# Patient Record
Sex: FEMALE | Race: WHITE | Employment: UNEMPLOYED | URBAN - METROPOLITAN AREA
[De-identification: names, ages, dates, MRNs, and addresses within clinical notes are randomized per-mention and may not be internally consistent; named-entity substitution may affect disease eponyms.]

---

## 2009-01-16 LAB — PAP SMEAR - HIM PATIENT REPORTED: NORMAL

## 2017-01-19 ENCOUNTER — ONCOLOGY VISIT (OUTPATIENT)
Dept: TRANSPLANT | Facility: CLINIC | Age: 57
End: 2017-01-19
Attending: INTERNAL MEDICINE
Payer: MEDICARE

## 2017-01-19 ENCOUNTER — APPOINTMENT (OUTPATIENT)
Dept: LAB | Facility: CLINIC | Age: 57
End: 2017-01-19
Attending: INTERNAL MEDICINE
Payer: MEDICARE

## 2017-01-19 VITALS
HEART RATE: 81 BPM | WEIGHT: 185.9 LBS | RESPIRATION RATE: 16 BRPM | DIASTOLIC BLOOD PRESSURE: 64 MMHG | TEMPERATURE: 97.2 F | SYSTOLIC BLOOD PRESSURE: 133 MMHG

## 2017-01-19 DIAGNOSIS — C91.01 ACUTE LYMPHOCYTIC LEUKEMIA IN REMISSION (H): ICD-10-CM

## 2017-01-19 LAB
ALBUMIN SERPL-MCNC: 3.7 G/DL (ref 3.4–5)
ALP SERPL-CCNC: 108 U/L (ref 40–150)
ALT SERPL W P-5'-P-CCNC: 23 U/L (ref 0–50)
ANION GAP SERPL CALCULATED.3IONS-SCNC: 7 MMOL/L (ref 3–14)
AST SERPL W P-5'-P-CCNC: 25 U/L (ref 0–45)
BASOPHILS # BLD AUTO: 0.1 10E9/L (ref 0–0.2)
BASOPHILS NFR BLD AUTO: 0.8 %
BILIRUB SERPL-MCNC: 0.2 MG/DL (ref 0.2–1.3)
BUN SERPL-MCNC: 15 MG/DL (ref 7–30)
CALCIUM SERPL-MCNC: 8.5 MG/DL (ref 8.5–10.1)
CHLORIDE SERPL-SCNC: 110 MMOL/L (ref 94–109)
CO2 SERPL-SCNC: 26 MMOL/L (ref 20–32)
CREAT SERPL-MCNC: 1.06 MG/DL (ref 0.52–1.04)
DIFFERENTIAL METHOD BLD: NORMAL
EOSINOPHIL # BLD AUTO: 0.3 10E9/L (ref 0–0.7)
EOSINOPHIL NFR BLD AUTO: 3.1 %
ERYTHROCYTE [DISTWIDTH] IN BLOOD BY AUTOMATED COUNT: 13.5 % (ref 10–15)
GFR SERPL CREATININE-BSD FRML MDRD: 53 ML/MIN/1.7M2
GLUCOSE SERPL-MCNC: 92 MG/DL (ref 70–99)
HCT VFR BLD AUTO: 38.7 % (ref 35–47)
HGB BLD-MCNC: 12.8 G/DL (ref 11.7–15.7)
IMM GRANULOCYTES # BLD: 0 10E9/L (ref 0–0.4)
IMM GRANULOCYTES NFR BLD: 0.4 %
LYMPHOCYTES # BLD AUTO: 2.5 10E9/L (ref 0.8–5.3)
LYMPHOCYTES NFR BLD AUTO: 27.2 %
MCH RBC QN AUTO: 32.3 PG (ref 26.5–33)
MCHC RBC AUTO-ENTMCNC: 33.1 G/DL (ref 31.5–36.5)
MCV RBC AUTO: 98 FL (ref 78–100)
MONOCYTES # BLD AUTO: 1 10E9/L (ref 0–1.3)
MONOCYTES NFR BLD AUTO: 10.9 %
NEUTROPHILS # BLD AUTO: 5.2 10E9/L (ref 1.6–8.3)
NEUTROPHILS NFR BLD AUTO: 57.6 %
NRBC # BLD AUTO: 0 10*3/UL
NRBC BLD AUTO-RTO: 0 /100
PLATELET # BLD AUTO: 305 10E9/L (ref 150–450)
POTASSIUM SERPL-SCNC: 3.9 MMOL/L (ref 3.4–5.3)
PROT SERPL-MCNC: 6.8 G/DL (ref 6.8–8.8)
RBC # BLD AUTO: 3.96 10E12/L (ref 3.8–5.2)
SODIUM SERPL-SCNC: 144 MMOL/L (ref 133–144)
WBC # BLD AUTO: 9.1 10E9/L (ref 4–11)

## 2017-01-19 PROCEDURE — 85025 COMPLETE CBC W/AUTO DIFF WBC: CPT | Performed by: INTERNAL MEDICINE

## 2017-01-19 PROCEDURE — 80053 COMPREHEN METABOLIC PANEL: CPT | Performed by: INTERNAL MEDICINE

## 2017-01-19 PROCEDURE — 99212 OFFICE O/P EST SF 10 MIN: CPT | Mod: ZF

## 2017-01-19 PROCEDURE — 81206 BCR/ABL1 GENE MAJOR BP: CPT | Performed by: INTERNAL MEDICINE

## 2017-01-19 PROCEDURE — 36415 COLL VENOUS BLD VENIPUNCTURE: CPT

## 2017-01-19 PROCEDURE — 81268 CHIMERISM ANAL W/CELL SELECT: CPT | Performed by: INTERNAL MEDICINE

## 2017-01-19 NOTE — MR AVS SNAPSHOT
After Visit Summary   1/19/2017    Kat Conteh    MRN: [id number]           Patient Information     Date Of Birth          1960        Visit Information        Provider Department      1/19/2017 10:30 AM Moon Quiñonez MD [facility] Blood and Marrow Transplant        Today's Diagnoses     Acute lymphocytic leukemia in remission (H)               [facility] [place] ([facility])  [address] [place] [address]  Phone: [phone]  Clinic Hours:   Monday-Friday:    8am to 4:30pm   Weekends and holidays:    8am to noon (in general)  If your fever is 100.5  or greater,   call the clinic.  After hours call the   hospital at [phone] or   [phone]. Ask for the BMT   fellow for pediatric or adult patients           Care Instructions    7/20 1030am labs and         Follow-ups after your visit        Your next 10 appointments already scheduled     Jul 20, 2017 11:00 AM   Return with Moon Quiñonez MD   [facility] Blood and Marrow Transplant ([facility] [place])    [address] [place] [address] [phone]              Who to contact     If you have questions or need follow up information about today's clinic visit or your schedule please contact [facility] BLOOD AND MARROW TRANSPLANT directly at [phone].  Normal or non-critical lab and imaging results will be communicated to you by MyChart, letter or phone within 4 business days after the clinic has received the results. If you do not hear from us within 7 days, please contact the clinic through MyChart or phone. If you have a critical or abnormal lab result, we will notify you by phone as soon as possible.  Submit refill requests through [employer] or call your pharmacy and they will forward the refill request to us. Please allow 3 business days for your refill to be completed.          Additional Information About Your Visit        MyChart Information      "[employer] lets you send messages to your doctor, view your test results, renew your prescriptions, schedule appointments and more. To sign up, go to [email or web address] . Click on \"Log in\" on the left side of the screen, which will take you to the Welcome page. Then click on \"Sign up Now\" on the right side of the page.     You will be asked to enter the access code listed below, as well as some personal information. Please follow the directions to create your username and password.     Your access code is: [email or web address]  Expires: 2017  6:30 AM     Your access code will  in 90 days. If you need help or a new code, please call your [place] clinic or [phone].        Care EveryWhere ID     This is your Care EveryWhere ID. This could be used by other organizations to access your [place] medical records  [id number]        Your Vitals Were     Pulse Temperature Respirations             81 97.2  F (36.2  C) (Oral) 16          Blood Pressure from Last 3 Encounters:   17 133/64   16 122/69   07/23/15 111/58    Weight from Last 3 Encounters:   17 84.324 kg (185 lb 14.4 oz)   16 81.7 kg (180 lb 1.9 oz)   07/23/15 81.3 kg (179 lb 3.7 oz)              We Performed the Following     BCR ABL1 Major Breakpoint Quant p210     CBC with platelets differential     Comprehensive metabolic panel     DNA marker post bmt engraft bld          Today's Medication Changes          These changes are accurate as of: 17 10:54 AM.  If you have any questions, ask your nurse or doctor.               These medicines have changed or have updated prescriptions.        Dose/Directions    omeprazole 20 MG CR capsule   Commonly known as:  priLOSEC   This may have changed:    - how much to take  - additional instructions   Used for:  ALL (acute lymphocytic leukemia) (H)        Dose:  40 mg   Take 2 capsules (40 mg) by mouth daily 30 minutes before breakfast.   Quantity:  60 capsule   Refills:  10              "   Recent Review Flowsheet Data     BMT Recent Results Latest Ref Rng 1/31/2012 7/17/2012 7/16/2013 7/7/2014 7/23/2015 7/21/2016 1/19/2017    WBC 4.0 - 11.0 10e9/L 6.5 6.2 7.0 7.1 6.5 7.5 9.1    Hemoglobin 11.7 - 15.7 g/dL 12.6 12.6 12.3 11.8 12.2 12.0 12.8    Platelet Count 150 - 450 10e9/L 239 232 257 233 238 271 305    Neutrophils (Absolute) 1.6 - 8.3 10e9/L 3.6 3.5 3.8 4.1 3.6 4.9 5.2    Sodium 133 - 144 mmol/L 142 143 141 140 143 141 144    Potassium 3.4 - 5.3 mmol/L 3.6 3.5 4.0 4.1 4.3 3.7 3.9    Chloride 94 - 109 mmol/L 104 105 104 102 110(H) 107 110(H)    Glucose 70 - 99 mg/dL 115(H) 109(H) 95 93 94 79 92    Urea Nitrogen 7 - 30 mg/dL 19 14 15 16 17 16 15    Creatinine 0.52 - 1.04 mg/dL 1.16(H) 1.15(H) 1.08(H) 1.14(H) 1.15(H) 1.14(H) 1.06(H)    Calcium (Total) 8.5 - 10.1 mg/dL 8.8 8.7 9.1 8.9 8.7 8.6 8.5    Protein (Total) 6.8 - 8.8 g/dL 6.5(L) 6.4(L) 6.7(L) 6.7(L) 6.1(L) 6.7(L) 6.8    Albumin 3.4 - 5.0 g/dL 3.7(L) 3.8(L) 4.0 4.0 3.5 3.6 3.7    Alkaline Phosphatase 40 - 150 U/L 101 84 110 92 108 104 108    AST 0 - 45 U/L 26 27 31 30 21 22 25    ALT 0 - 50 U/L 17 19 32 33 25 22 23    MCV 78 - 100 fl 101(H) 100 101(H) 100 101(H) 103(H) 98               Primary Care Provider Office Phone # Fax #    Duane Eric Westberg [phone]       [address] [address]        Thank you!     Thank you for choosing [facility] BLOOD AND MARROW TRANSPLANT  for your care. Our goal is always to provide you with excellent care. Hearing back from our patients is one way we can continue to improve our services. Please take a few minutes to complete the written survey that you may receive in the mail after your visit with us. Thank you!             Your Updated Medication List - Protect others around you: Learn how to safely use, store and throw away your medicines at www.disposemymeds.org.          This list is accurate as of: 1/19/17 10:54 AM.  Always use your most recent med list.                    Brand Name Dispense Instructions for use    fentaNYL 25 mcg/hr 72 hr patch    DURAGESIC    10 patch    Place 1 patch onto the skin every 72 hours.       furosemide 20 MG tablet    LASIX    30 tablet    Take 1 tablet (20 mg) by mouth daily AS NEEDED       imatinib 100 MG tablet    GLEEVEC    90 tablet    Take 3 tablets (300 mg) by mouth daily       omeprazole 20 MG CR capsule    priLOSEC    60 capsule    Take 2 capsules (40 mg) by mouth daily 30 minutes before breakfast.       oxyCODONE 5 MG IR tablet    ROXICODONE    120 tablet    Take 1-2 tablets by mouth every 4 hours as needed. Take 1-2 tablets q4-6 hours prn pain. Disp. 120       vitamin D3 78576 UNITS capsule    CHOLECALCIFEROL     Take 5,000 Units by mouth daily

## 2017-01-19 NOTE — PROGRESS NOTES
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 She obtained her repeat CR with imatinib alone and no DLI. We did collect lymphocytes for potential DLI should the need arise. She has remained on imatinib since that time in ongoing molecular remission and 100% donor.     HPI: I last saw her 6 months ago at which point she wanted to attempt a drop in imatinib dose due to side effects and her long remission status. She notes that the drop in dosage has helped with the muscle cramps. The episodes are no longer daily/nightly and seem less intense. She notes no major infections since the summer. Notes that she had a recent physical with her primary doctor and that all checked out good. She got her flu shot as well. No new procedures, no hospitalizations, no major concerns. Bowels ok. Has had episodes of small patches of skin blisters that come up in various parts of her body and last about 5-10 days. She notes that they look like \"poison ivy\" and resolve with either calamine lotion or the hydrocortisone. They are itchy but no tingling or numbness or pain to suggest shingles.     ROS: 10 point ROS otherwise negative     PHYSICAL EXAM:     /64 mmHg  Pulse 81  Temp(Src) 97.2  F (36.2  C) (Oral)  Resp 16  Wt 84.324 kg (185 lb 14.4 oz)    Gen: Looks well, NAD. ECOG PS 1  HEENT: no icterus or injection, OP clear, buccal mucosa normal. No thrush  Lungs:CTAB no crackles or wheezes  CV:RRR no r/m/g  Abd: soft, NT/ND  Ext: no edema, no rash.     Labs:  WBC      9.1   1/19/2017  RBC     3.96   1/19/2017  HGB     12.8   1/19/2017  HCT     38.7   1/19/2017  No components found with this name: mct  MCV       98   1/19/2017  MCH     32.3   1/19/2017  MCHC     33.1   " 1/19/2017  RDW     13.5   1/19/2017  PLT      305   1/19/2017    Last Basic Metabolic Panel:  NA      144   1/19/2017   POTASSIUM      3.9   1/19/2017  CHLORIDE      110   1/19/2017  KYLAH      8.5   1/19/2017  CO2       26   1/19/2017  BUN       15   1/19/2017  CR     1.06   1/19/2017  GLC       92   1/19/2017  AST       25   1/19/2017  ALT       23   1/19/2017  BILICONJ      0.0   12/7/2010   BILITOTAL      0.2   1/19/2017  ALBUMIN      3.7   1/19/2017  PROTTOTAL      6.8   1/19/2017   ALKPHOS      108   1/19/2017        ASSESSMENT/PLANS:     1. 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  --2 year anniversary visit (7/2011) showed no marrow evidence of ALL and 100% donor. BCR-ABL PCR on blood undetectable. FISH testing on marrow undetectable.  -- 3 -6 year anniversary visit with no evidence of disease  -- Repeat PB BCR-ABL pending from today. Discussed that the ongoing need for gleevec now this many years out is unclear as there is no data to direct this. Given 100% donor and bcr-abl negative for multiple years discussed trying to taper the gleevec off, also in light of her symptoms. She is doing well with the drop to 300 mg daily. Repeat BCR-ABL pending. If remains undetectable she is wanting to drop to 200 mg daily which is reasonable given how far out she is from transplant.      2. 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 Lip biopsy completed 4/13 and showed mild sialadenitis with focal atrophy suggestive of cGVHD. Not needing any treatment and not active currently     3. ID: no current antibiotics. No recent infections over last  6 months.   --S/P 1 year anniversary vaccines 7/10, 14 month boosters 10/10 (including flu shot).  --2 year anniversary vaccines 7/2011     4. GI: no issues  --cont omeprazole but has dropped the dose given the studies showing long term kidney impacts.        5. FEN/Renal: stable to improved    6. Health Maintenance: has a primary doctor, Dr. Castellano, back home. Recent evaluation in December 7. Pain control: Being managed by her primary doc    8. Muscle cramps: intermittent.Improving with lower imatinib dose    Final Plan:  - RTC in 6 months  - if BCR-ABL still undetectable will drop dosing to 200 mg daily      Moon Quiñonez MD    Addendum 1/19/2017:  BCR-ABL undetectable and 100% donor. Drop gleevec to 200 mg daily.    Moon Quiñonez

## 2017-01-19 NOTE — NURSING NOTE
BMT Heme Malignancy Rooming Note    Kat Conteh - 1/19/2017 10:25 AM     Chief Complaint   Patient presents with     Blood Draw     Pt is here for labs for her BMT provider visit     RECHECK     Patient here to see provider and labs follow up for ALL        /64 mmHg  Pulse 81  Temp(Src) 97.2  F (36.2  C) (Oral)  Resp 16  Wt 84.324 kg (185 lb 14.4 oz)     Medications reviewed: Yes    Labs drawn: No    Dressing changed: Not applicable     Medications given: No    Staff time:5    Additional information if applicable: Patient denies any complaints    Lolis Sun RN, OCN, BMTCN

## 2017-01-20 LAB
COPATH REPORT: NORMAL

## 2017-01-23 RX ORDER — IMATINIB MESYLATE 100 MG/1
200 TABLET, FILM COATED ORAL DAILY
Qty: 60 TABLET | Refills: 3 | Status: SHIPPED
Start: 2017-01-23 | End: 2017-07-20

## 2017-07-20 ENCOUNTER — APPOINTMENT (OUTPATIENT)
Dept: LAB | Facility: CLINIC | Age: 57
End: 2017-07-20
Attending: INTERNAL MEDICINE
Payer: MEDICARE

## 2017-07-20 ENCOUNTER — ONCOLOGY VISIT (OUTPATIENT)
Dept: TRANSPLANT | Facility: CLINIC | Age: 57
End: 2017-07-20
Attending: INTERNAL MEDICINE
Payer: MEDICARE

## 2017-07-20 VITALS
OXYGEN SATURATION: 98 % | SYSTOLIC BLOOD PRESSURE: 123 MMHG | WEIGHT: 187.1 LBS | TEMPERATURE: 97.7 F | RESPIRATION RATE: 16 BRPM | HEART RATE: 72 BPM | DIASTOLIC BLOOD PRESSURE: 74 MMHG

## 2017-07-20 DIAGNOSIS — C91.01 ACUTE LYMPHOCYTIC LEUKEMIA IN REMISSION (H): ICD-10-CM

## 2017-07-20 LAB
ALBUMIN SERPL-MCNC: 3.6 G/DL (ref 3.4–5)
ALP SERPL-CCNC: 110 U/L (ref 40–150)
ALT SERPL W P-5'-P-CCNC: 21 U/L (ref 0–50)
ANION GAP SERPL CALCULATED.3IONS-SCNC: 6 MMOL/L (ref 3–14)
AST SERPL W P-5'-P-CCNC: 16 U/L (ref 0–45)
BASOPHILS # BLD AUTO: 0.1 10E9/L (ref 0–0.2)
BASOPHILS NFR BLD AUTO: 1 %
BILIRUB SERPL-MCNC: 0.4 MG/DL (ref 0.2–1.3)
BUN SERPL-MCNC: 23 MG/DL (ref 7–30)
CALCIUM SERPL-MCNC: 8.7 MG/DL (ref 8.5–10.1)
CHLORIDE SERPL-SCNC: 103 MMOL/L (ref 94–109)
CO2 SERPL-SCNC: 31 MMOL/L (ref 20–32)
CREAT SERPL-MCNC: 1.14 MG/DL (ref 0.52–1.04)
DIFFERENTIAL METHOD BLD: ABNORMAL
EOSINOPHIL # BLD AUTO: 0.3 10E9/L (ref 0–0.7)
EOSINOPHIL NFR BLD AUTO: 3.7 %
ERYTHROCYTE [DISTWIDTH] IN BLOOD BY AUTOMATED COUNT: 14.1 % (ref 10–15)
GFR SERPL CREATININE-BSD FRML MDRD: 49 ML/MIN/1.7M2
GLUCOSE SERPL-MCNC: 99 MG/DL (ref 70–99)
HCT VFR BLD AUTO: 42 % (ref 35–47)
HGB BLD-MCNC: 13.1 G/DL (ref 11.7–15.7)
IMM GRANULOCYTES # BLD: 0 10E9/L (ref 0–0.4)
IMM GRANULOCYTES NFR BLD: 0.3 %
LYMPHOCYTES # BLD AUTO: 2.3 10E9/L (ref 0.8–5.3)
LYMPHOCYTES NFR BLD AUTO: 32.3 %
MCH RBC QN AUTO: 30.8 PG (ref 26.5–33)
MCHC RBC AUTO-ENTMCNC: 31.2 G/DL (ref 31.5–36.5)
MCV RBC AUTO: 99 FL (ref 78–100)
MONOCYTES # BLD AUTO: 0.9 10E9/L (ref 0–1.3)
MONOCYTES NFR BLD AUTO: 12.7 %
NEUTROPHILS # BLD AUTO: 3.5 10E9/L (ref 1.6–8.3)
NEUTROPHILS NFR BLD AUTO: 50 %
NRBC # BLD AUTO: 0 10*3/UL
NRBC BLD AUTO-RTO: 0 /100
PLATELET # BLD AUTO: 311 10E9/L (ref 150–450)
POTASSIUM SERPL-SCNC: 3.6 MMOL/L (ref 3.4–5.3)
PROT SERPL-MCNC: 6.9 G/DL (ref 6.8–8.8)
RBC # BLD AUTO: 4.26 10E12/L (ref 3.8–5.2)
SODIUM SERPL-SCNC: 141 MMOL/L (ref 133–144)
WBC # BLD AUTO: 7.1 10E9/L (ref 4–11)

## 2017-07-20 PROCEDURE — 85025 COMPLETE CBC W/AUTO DIFF WBC: CPT | Performed by: INTERNAL MEDICINE

## 2017-07-20 PROCEDURE — 81206 BCR/ABL1 GENE MAJOR BP: CPT | Performed by: INTERNAL MEDICINE

## 2017-07-20 PROCEDURE — 36415 COLL VENOUS BLD VENIPUNCTURE: CPT

## 2017-07-20 PROCEDURE — 99212 OFFICE O/P EST SF 10 MIN: CPT | Mod: ZF

## 2017-07-20 PROCEDURE — 80053 COMPREHEN METABOLIC PANEL: CPT | Performed by: INTERNAL MEDICINE

## 2017-07-20 RX ORDER — IMATINIB MESYLATE 100 MG/1
200 TABLET, FILM COATED ORAL DAILY
Qty: 60 TABLET | Refills: 3 | Status: SHIPPED | OUTPATIENT
Start: 2017-07-20 | End: 2017-07-28

## 2017-07-20 ASSESSMENT — PAIN SCALES - GENERAL: PAINLEVEL: NO PAIN (0)

## 2017-07-20 NOTE — NURSING NOTE
Oncology Rooming Note    July 20, 2017 10:42 AM   Kat Conteh is a 57 year old female who presents for:    Chief Complaint   Patient presents with     Blood Draw     Labs drawn from right arm by MA     RECHECK     ALL     Initial Vitals: /74  Pulse 72  Temp 97.7  F (36.5  C) (Oral)  Resp 16  Wt 84.9 kg (187 lb 1.6 oz)  SpO2 98% There is no height or weight on file to calculate BMI. There is no height or weight on file to calculate BSA.  No Pain (0) Comment: Data Unavailable   No LMP recorded. Patient is postmenopausal.  Allergies reviewed: Yes  Medications reviewed: Yes    Medications: Medication refills not needed today.  Pharmacy name entered into [employer]: [id number]    Clinical concerns: n/a     5 minutes for nursing intake (face to face time)     BRYCE SWARTZ CMA

## 2017-07-20 NOTE — NURSING NOTE
Chief Complaint   Patient presents with     Blood Draw     Labs drawn from right arm by MA     Pt tolerated well  Marie Fernandez MA

## 2017-07-20 NOTE — MR AVS SNAPSHOT
After Visit Summary   7/20/2017    Kat Conteh    MRN: [id number]           Patient Information     Date Of Birth          1960        Visit Information        Provider Department      7/20/2017 11:00 AM Moon Quiñonez MD [facility] Blood and Marrow Transplant        Today's Diagnoses     Acute lymphocytic leukemia in remission ()              [facility] [place] ([facility])  [address] [place] [address]  Phone: [phone]  Clinic Hours:   Monday-Thursday:7am to 7pm   Friday: 7am to 5pm   Weekends and holidays:    8am to noon (in general)  If your fever is 100.5  or greater,   call the clinic.  After hours call the   hospital at [phone] or   [phone]. Ask for the BMT   fellow on-call           Care Instructions    traci bmt in 6 months with labs prior          Follow-ups after your visit        Your next 10 appointments already scheduled     Jan 18, 2018 10:30 AM CST   [facility] with  [facility]   [facility] ([facility])    [address] [place] [address] [phone]            Jan 18, 2018 11:00 AM CST   Return with Moon Quiñonez MD   [facility] Blood and Marrow Transplant ([facility])    [address] [place] [address] [phone]              Who to contact     If you have questions or need follow up information about today's clinic visit or your schedule please contact [facility] BLOOD AND MARROW TRANSPLANT directly at [phone].  Normal or non-critical lab and imaging results will be communicated to you by MyChart, letter or phone within 4 business days after the clinic has received the results. If you do not hear from us within 7 days, please contact the clinic through MyChart or phone. If you have a critical or abnormal lab result, we will notify you by phone as soon as possible.  Submit refill requests  "through [employer] or call your pharmacy and they will forward the refill request to us. Please allow 3 business days for your refill to be completed.          Additional Information About Your Visit        [employer] Information     [employer] lets you send messages to your doctor, view your test results, renew your prescriptions, schedule appointments and more. To sign up, go to [email or web address] . Click on \"Log in\" on the left side of the screen, which will take you to the Welcome page. Then click on \"Sign up Now\" on the right side of the page.     You will be asked to enter the access code listed below, as well as some personal information. Please follow the directions to create your username and password.     Your access code is: [email or web address]  Expires: 10/4/2017  6:30 AM     Your access code will  in 90 days. If you need help or a new code, please call your [place] clinic or [phone].        Care EveryWhere ID     This is your Care EveryWhere ID. This could be used by other organizations to access your [place] medical records  [id number]        Your Vitals Were     Pulse Temperature Respirations Pulse Oximetry          72 97.7  F (36.5  C) (Oral) 16 98%         Blood Pressure from Last 3 Encounters:   17 123/74   17 133/64   16 122/69    Weight from Last 3 Encounters:   17 84.9 kg (187 lb 1.6 oz)   17 84.3 kg (185 lb 14.4 oz)   16 81.7 kg (180 lb 1.9 oz)              We Performed the Following     BCR ABL1 Major Breakpoint Quant p210     CBC with platelets differential     Comprehensive metabolic panel          Where to get your medicines      These medications were sent to [id number] - [place], [place] - [address] [place] [address] [address] [place] [address] [address] [place] [address]    Hours:  Test Rx sent successfully 10/3/03   Phone:  [phone]     imatinib 100 MG tablet          Recent Review Flowsheet Data     BMT Recent Results Latest Ref Rng & Units " 7/17/2012 7/16/2013 7/7/2014 7/23/2015 7/21/2016 1/19/2017 7/20/2017    WBC 4.0 - 11.0 10e9/L 6.2 7.0 7.1 6.5 7.5 9.1 7.1    Hemoglobin 11.7 - 15.7 g/dL 12.6 12.3 11.8 12.2 12.0 12.8 13.1    Platelet Count 150 - 450 10e9/L 232 257 233 238 271 305 311    Neutrophils (Absolute) 1.6 - 8.3 10e9/L 3.5 3.8 4.1 3.6 4.9 5.2 3.5    INR 0.86 - 1.14 - - - - - - -    Sodium 133 - 144 mmol/L 143 141 140 143 141 144 -    Potassium 3.4 - 5.3 mmol/L 3.5 4.0 4.1 4.3 3.7 3.9 -    Chloride 94 - 109 mmol/L 105 104 102 110(H) 107 110(H) -    Glucose 70 - 99 mg/dL 109(H) 95 93 94 79 92 -    Urea Nitrogen 7 - 30 mg/dL 14 15 16 17 16 15 -    Creatinine 0.52 - 1.04 mg/dL 1.15(H) 1.08(H) 1.14(H) 1.15(H) 1.14(H) 1.06(H) -    Calcium (Total) 8.5 - 10.1 mg/dL 8.7 9.1 8.9 8.7 8.6 8.5 -    Protein (Total) 6.8 - 8.8 g/dL 6.4(L) 6.7(L) 6.7(L) 6.1(L) 6.7(L) 6.8 -    Albumin 3.4 - 5.0 g/dL 3.8(L) 4.0 4.0 3.5 3.6 3.7 -    Alkaline Phosphatase 40 - 150 U/L 84 110 92 108 104 108 -    AST 0 - 45 U/L 27 31 30 21 22 25 -    ALT 0 - 50 U/L 19 32 33 25 22 23 -    MCV 78 - 100 fl 100 101(H) 100 101(H) 103(H) 98 99               Primary Care Provider Office Phone # Fax #    Duane Eric Westberg [phone]       [address] [address]        Equal Access to Services     IAM GILLIAM : Javi rashid Soisaias, wanicolasada luqadaha, qaybta kaalmada adedavid, gallito franco. So [facility] [phone].    ATENCIÓN: Si habla español, tiene a borden disposición servicios gratuitos de asistencia lingüística. Llame al [phone].    We comply with applicable federal civil rights laws and [place] laws. We do not discriminate on the basis of race, color, national origin, age, disability sex, sexual orientation or gender identity.            Thank you!     Thank you for choosing [facility] BLOOD AND MARROW TRANSPLANT  for your care. Our goal is always to provide you with excellent care. Hearing back  from our patients is one way we can continue to improve our services. Please take a few minutes to complete the written survey that you may receive in the mail after your visit with us. Thank you!             Your Updated Medication List - Protect others around you: Learn how to safely use, store and throw away your medicines at www.disposemymeds.org.          This list is accurate as of: 7/20/17 11:07 AM.  Always use your most recent med list.                   Brand Name Dispense Instructions for use Diagnosis    fentaNYL 25 mcg/hr 72 hr patch    DURAGESIC    10 patch    Place 1 patch onto the skin every 72 hours.    ALL (acute lymphocytic leukemia) (H), Radiating back pain       furosemide 20 MG tablet    LASIX    30 tablet    Take 1 tablet (20 mg) by mouth daily AS NEEDED    ALL (acute lymphocytic leukemia) (H), Status post bone marrow transplant (H)       imatinib 100 MG tablet    GLEEVEC    60 tablet    Take 2 tablets (200 mg) by mouth daily    Acute lymphocytic leukemia in remission (H)       oxyCODONE 5 MG IR tablet    ROXICODONE    120 tablet    Take 1-2 tablets by mouth every 4 hours as needed. Take 1-2 tablets q4-6 hours prn pain. Disp. 120    ALL (acute lymphocytic leukemia) (H), Radiating back pain       vitamin D3 34189 UNITS capsule    CHOLECALCIFEROL     Take 5,000 Units by mouth daily        ZANTAC PO      Take 150 mg by mouth 2 times daily

## 2017-07-20 NOTE — PROGRESS NOTES
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 She obtained her repeat CR with imatinib alone and no DLI. We did collect lymphocytes for potential DLI should the need arise. She has remained on imatinib since that time in ongoing molecular remission and 100% donor.     HPI: I last saw her 6 months ago at which point we further dropped her imatinib a little further to 200 mg daily. She comes in today for labs and follow-up. Since I saw her in January she is doing great. Symptoms much improved on the 200 mg dosing and is anxious to hopefully come off. The only issue now is a flare in her GERD with her primary docs switch of prilosec to zantac. No diarrhea, no oral issues, no rash, muscle cramps much improved.    ROS: 10 point ROS otherwise negative     PHYSICAL EXAM:     /74  Pulse 72  Temp 97.7  F (36.5  C) (Oral)  Resp 16  Wt 84.9 kg (187 lb 1.6 oz)  SpO2 98%    Gen: Looks well, NAD. ECOG PS 1  HEENT: no icterus or injection, OP clear, buccal mucosa normal. No thrush  Lungs:CTAB no crackles or wheezes  CV:RRR no r/m/g  Abd: soft, NT/ND  Ext: no edema, no rash.     Labs:  Results for KEYSHA PAGE (MRN [id number]) as of 7/20/2017 12:18   Ref. Range 7/20/2017 10:35   Sodium Latest Ref Range: 133 - 144 mmol/L 141   Potassium Latest Ref Range: 3.4 - 5.3 mmol/L 3.6   Chloride Latest Ref Range: 94 - 109 mmol/L 103   Carbon Dioxide Latest Ref Range: 20 - 32 mmol/L 31   Urea Nitrogen Latest Ref Range: 7 - 30 mg/dL 23   Creatinine Latest Ref Range: 0.52 - 1.04 mg/dL 1.14 (H)   GFR Estimate Latest Ref Range: >60 mL/min/1.7m2 49 (L)   GFR Estimate If Black Latest Ref Range: >60 mL/min/1.7m2 59 (L)   Calcium Latest Ref Range: 8.5 - 10.1 mg/dL 8.7   Anion Gap  Latest Ref Range: 3 - 14 mmol/L 6   Albumin Latest Ref Range: 3.4 - 5.0 g/dL 3.6   Protein Total Latest Ref Range: 6.8 - 8.8 g/dL 6.9   Bilirubin Total Latest Ref Range: 0.2 - 1.3 mg/dL 0.4   Alkaline Phosphatase Latest Ref Range: 40 - 150 U/L 110   ALT Latest Ref Range: 0 - 50 U/L 21   AST Latest Ref Range: 0 - 45 U/L 16   Glucose Latest Ref Range: 70 - 99 mg/dL 99   WBC Latest Ref Range: 4.0 - 11.0 10e9/L 7.1   Hemoglobin Latest Ref Range: 11.7 - 15.7 g/dL 13.1   Hematocrit Latest Ref Range: 35.0 - 47.0 % 42.0   Platelet Count Latest Ref Range: 150 - 450 10e9/L 311           ASSESSMENT/PLANS:     1. 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  --2 year anniversary visit (7/2011) showed no marrow evidence of ALL and 100% donor. BCR-ABL PCR on blood undetectable. FISH testing on marrow undetectable.  -- 3 -7 year anniversary visit with no evidence of disease  -- At the previous visit we discussed that the ongoing need for gleevec now this many years out is unclear as there is no data to direct this. Given 100% donor and bcr-abl negative for multiple years we have been trying to taper the gleevec down  in light of her symptoms. We dropped to 200 mg daily 6 months ago and we are checking repeat bcr-abl. Next steps depending on there esults     2. 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 No symptoms currently off steroids  --History of cGVHD: Facial rash (4/13/10) not biopsied due to location + oral mucosa changes. Lip biopsy completed 4/13 and showed mild sialadenitis with focal atrophy suggestive of cGVHD. Not needing any treatment and not active currently     3. ID: no current antibiotics. No recent infections over last year  --S/P 1 year anniversary  vaccines 7/10, 14 month boosters 10/10 (including flu shot).  --2 year anniversary vaccines 7/2011     4. GI:flare in heartburn on zantac,. Encouraged her to talk with her primary doc about this  --cont omeprazole but has dropped the dose given the studies showing long term kidney impacts.        5. FEN/Renal: stable to improved    6. Health Maintenance: has a primary doctor, Dr. Castellano, back home. Recent evaluation in December 7. Pain control: Being managed by her primary doc    8. Muscle cramps:.Improving with lower imatinib dose    Final Plan:  RTC 6 months with labs prior    Moon Quiñonez

## 2017-07-24 LAB — COPATH REPORT: NORMAL

## 2017-07-28 DIAGNOSIS — C91.01 ACUTE LYMPHOCYTIC LEUKEMIA IN REMISSION (H): ICD-10-CM

## 2017-07-28 RX ORDER — IMATINIB MESYLATE 100 MG/1
100 TABLET, FILM COATED ORAL DAILY
Qty: 60 TABLET | Refills: 3 | COMMUNITY
Start: 2017-07-28 | End: 2019-08-01

## 2017-07-28 NOTE — PROGRESS NOTES
Notified patient via phone call that her BCR-ABL is undetectable. Patient wondering if she can decrease Gleevec to 100mg daily. Per Dr Quiñonez, ok to decrease to 100mg daily. Will recheck BCR-ABL in 6 months. Patient agreeable with plan and verbalized understanding.

## 2018-01-18 ENCOUNTER — CARE COORDINATION (OUTPATIENT)
Dept: TRANSPLANT | Facility: CLINIC | Age: 58
End: 2018-01-18

## 2018-01-18 ENCOUNTER — ONCOLOGY VISIT (OUTPATIENT)
Dept: TRANSPLANT | Facility: CLINIC | Age: 58
End: 2018-01-18
Attending: INTERNAL MEDICINE
Payer: MEDICARE

## 2018-01-18 VITALS
DIASTOLIC BLOOD PRESSURE: 76 MMHG | RESPIRATION RATE: 16 BRPM | TEMPERATURE: 98 F | SYSTOLIC BLOOD PRESSURE: 129 MMHG | HEART RATE: 61 BPM | OXYGEN SATURATION: 98 % | WEIGHT: 187.3 LBS

## 2018-01-18 DIAGNOSIS — C91.01 ACUTE LYMPHOCYTIC LEUKEMIA IN REMISSION (H): ICD-10-CM

## 2018-01-18 LAB
ALBUMIN SERPL-MCNC: 3.5 G/DL (ref 3.4–5)
ALP SERPL-CCNC: 100 U/L (ref 40–150)
ALT SERPL W P-5'-P-CCNC: 20 U/L (ref 0–50)
ANION GAP SERPL CALCULATED.3IONS-SCNC: 7 MMOL/L (ref 3–14)
AST SERPL W P-5'-P-CCNC: 17 U/L (ref 0–45)
BASOPHILS # BLD AUTO: 0.1 10E9/L (ref 0–0.2)
BASOPHILS NFR BLD AUTO: 1.1 %
BILIRUB SERPL-MCNC: 0.3 MG/DL (ref 0.2–1.3)
BUN SERPL-MCNC: 22 MG/DL (ref 7–30)
CALCIUM SERPL-MCNC: 8.6 MG/DL (ref 8.5–10.1)
CHLORIDE SERPL-SCNC: 107 MMOL/L (ref 94–109)
CO2 SERPL-SCNC: 26 MMOL/L (ref 20–32)
CREAT SERPL-MCNC: 1.08 MG/DL (ref 0.52–1.04)
DIFFERENTIAL METHOD BLD: NORMAL
EOSINOPHIL # BLD AUTO: 0.2 10E9/L (ref 0–0.7)
EOSINOPHIL NFR BLD AUTO: 3.3 %
ERYTHROCYTE [DISTWIDTH] IN BLOOD BY AUTOMATED COUNT: 14 % (ref 10–15)
GFR SERPL CREATININE-BSD FRML MDRD: 52 ML/MIN/1.7M2
GLUCOSE SERPL-MCNC: 108 MG/DL (ref 70–99)
HCT VFR BLD AUTO: 40.9 % (ref 35–47)
HGB BLD-MCNC: 13.1 G/DL (ref 11.7–15.7)
IMM GRANULOCYTES # BLD: 0 10E9/L (ref 0–0.4)
IMM GRANULOCYTES NFR BLD: 0.1 %
LYMPHOCYTES # BLD AUTO: 2.4 10E9/L (ref 0.8–5.3)
LYMPHOCYTES NFR BLD AUTO: 32.7 %
MCH RBC QN AUTO: 30.5 PG (ref 26.5–33)
MCHC RBC AUTO-ENTMCNC: 32 G/DL (ref 31.5–36.5)
MCV RBC AUTO: 95 FL (ref 78–100)
MONOCYTES # BLD AUTO: 1 10E9/L (ref 0–1.3)
MONOCYTES NFR BLD AUTO: 13.8 %
NEUTROPHILS # BLD AUTO: 3.6 10E9/L (ref 1.6–8.3)
NEUTROPHILS NFR BLD AUTO: 49 %
NRBC # BLD AUTO: 0 10*3/UL
NRBC BLD AUTO-RTO: 0 /100
PLATELET # BLD AUTO: 301 10E9/L (ref 150–450)
POTASSIUM SERPL-SCNC: 4 MMOL/L (ref 3.4–5.3)
PROT SERPL-MCNC: 6.8 G/DL (ref 6.8–8.8)
RBC # BLD AUTO: 4.3 10E12/L (ref 3.8–5.2)
SODIUM SERPL-SCNC: 141 MMOL/L (ref 133–144)
WBC # BLD AUTO: 7.3 10E9/L (ref 4–11)

## 2018-01-18 PROCEDURE — G0463 HOSPITAL OUTPT CLINIC VISIT: HCPCS | Mod: ZF

## 2018-01-18 PROCEDURE — 36415 COLL VENOUS BLD VENIPUNCTURE: CPT

## 2018-01-18 PROCEDURE — G0008 ADMIN INFLUENZA VIRUS VAC: HCPCS

## 2018-01-18 PROCEDURE — G0463 HOSPITAL OUTPT CLINIC VISIT: HCPCS | Mod: 25

## 2018-01-18 PROCEDURE — 85025 COMPLETE CBC W/AUTO DIFF WBC: CPT | Performed by: INTERNAL MEDICINE

## 2018-01-18 PROCEDURE — 81268 CHIMERISM ANAL W/CELL SELECT: CPT | Performed by: INTERNAL MEDICINE

## 2018-01-18 PROCEDURE — 25000128 H RX IP 250 OP 636: Mod: ZF | Performed by: INTERNAL MEDICINE

## 2018-01-18 PROCEDURE — 90686 IIV4 VACC NO PRSV 0.5 ML IM: CPT | Mod: ZF | Performed by: INTERNAL MEDICINE

## 2018-01-18 PROCEDURE — 80053 COMPREHEN METABOLIC PANEL: CPT | Performed by: INTERNAL MEDICINE

## 2018-01-18 PROCEDURE — 81206 BCR/ABL1 GENE MAJOR BP: CPT | Performed by: INTERNAL MEDICINE

## 2018-01-18 RX ADMIN — INFLUENZA A VIRUS A/MICHIGAN/45/2015 X-275 (H1N1) ANTIGEN (FORMALDEHYDE INACTIVATED), INFLUENZA A VIRUS A/HONG KONG/4801/2014 X-263B (H3N2) ANTIGEN (FORMALDEHYDE INACTIVATED), INFLUENZA B VIRUS B/PHUKET/3073/2013 ANTIGEN (FORMALDEHYDE INACTIVATED), AND INFLUENZA B VIRUS B/BRISBANE/60/2008 ANTIGEN (FORMALDEHYDE INACTIVATED) 0.5 ML: 15; 15; 15; 15 INJECTION, SUSPENSION INTRAMUSCULAR at 11:54

## 2018-01-18 ASSESSMENT — PAIN SCALES - GENERAL: PAINLEVEL: NO PAIN (0)

## 2018-01-18 NOTE — NURSING NOTE
Oncology Rooming Note    January 18, 2018 11:14 AM   Kat Conteh is a 58 year old female who presents for:    Chief Complaint   Patient presents with     Blood Draw     vitals done and labs by MA      RECHECK     ALL     Initial Vitals: /76  Pulse 61  Temp 98  F (36.7  C) (Tympanic)  Resp 16  Wt 85 kg (187 lb 4.8 oz)  SpO2 98% There is no height or weight on file to calculate BMI. There is no height or weight on file to calculate BSA.  No Pain (0) Comment: Data Unavailable   No LMP recorded. Patient is postmenopausal.  Allergies reviewed: Yes  Medications reviewed: Yes    Medications: Medication refills not needed today.  Pharmacy name entered into [facility]: [id number] [address]    Clinical concerns: n/a     6 minutes for nursing intake (face to face time)     BRYCE SWARTZ [facility]

## 2018-01-18 NOTE — PROGRESS NOTES
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 She obtained her repeat CR with imatinib alone and no DLI. We did collect lymphocytes for potential DLI should the need arise. She has remained on imatinib since that time in ongoing molecular remission and 100% donor.     Interval history:   Ms. Conteh presents today with her  Seferino and sister Lizeth.    She reports overall doing well but wants to stop imatinib.  Her primary symptoms on this medication have been migratory, intermittent cramps in various joints as well as water retention (primarily around eyes), both of which have improved since tapering imatinib.  For example, her cramps occur less than once a week.    Otherwise, no other questions concerns.    She has been remaining active babysitting a 4-year-old grandchild.  She and her  have 4 children total, (one child each from prior relationships, two together).  Two of her children live in [place], the rest locally. She has four grandchildren.      On ROS in addition to the above  Endorses:  +dry eyes and irritated    Denies  fevers, chills, NS, HA, dysphagia/odynophagia, change in weight, change in appetite, cough, SOB, CP, n/v, abd pain, constipation/diarrhea, hematochezia, dysuria, hematuria, swelling, rashes, lymphadenopathy    Social History:   Reviewed in EPIC    Social History   Substance Use Topics     Smoking status: Former Smoker     Quit date: 3/30/2009     Smokeless tobacco: Not on file     Alcohol use Not on file     Medications:   Reviewed in [facility]    Current Outpatient Prescriptions   Medication Sig     OMEPRAZOLE PO Take 20 mg by mouth     imatinib (GLEEVEC) 100 MG tablet Take 1 tablet (100 mg) by mouth daily     RaNITidine HCl (ZANTAC PO) Take 150 mg by mouth 2 times daily     furosemide (LASIX) 20 MG tablet Take 1 tablet (20 mg) by  mouth daily AS NEEDED     vitamin D3 (CHOLECALCIFEROL) 25333 UNITS capsule Take 5,000 Units by mouth daily      fentanyl (DURAGESIC) 25 mcg/hr patch 72 hr Place 1 patch onto the skin every 72 hours.     oxycodone (ROXICODONE) 5 MG immediate release tablet Take 1-2 tablets by mouth every 4 hours as needed. Take 1-2 tablets q4-6 hours prn pain. Disp. 120     No current facility-administered medications for this visit.       Physical Exam (Resident / Clinician):   Blood pressure 129/76, pulse 61, temperature 98  F (36.7  C), temperature source Tympanic, resp. rate 16, weight 85 kg (187 lb 4.8 oz), SpO2 98 %.    ECOG PS: 0  Constitutional: WDWN female in NAD, pleasant and appropriate  HEENT:  NC/AT, no icterus nor injection, OP clear, MMM  Skin: No jaundice nor ecchymoses  Lungs: CTAB, no w/r/r, nonlabored breathing  Cardiovascular: RRR, S1, S2, no m/r/g  Abdomen: +BS, soft, nontender, nondistended, no organomegaly nor masses  MSK/Extremities: Warm, well perfused. No edema  LN: no cervical, supraclavicular, axillary, nor inguinal lymphadenopathy  Neurologic: alert, answering questions appropriately, moving all extremities spontaneously  Psych: appropriate affect  Data:   Reviewed in EPIC and notable for:    WBC count 7.3, hemoglobin 13.1, platelet 301, ANC 3.6  Creatinine 1.08, hepatic panel and electrolytes within normal limits    Assessment and Plan:     1. 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  --2 year anniversary visit (7/2011) showed no marrow evidence of ALL and 100% donor. BCR-ABL PCR on blood undetectable. FISH testing on marrow undetectable.  -- 3 -7 year anniversary visit with no evidence of disease  -- Given 100% donor (last checked 1/2017, pending today) and bcr-abl negative for multiple years we  have been trying to taper the gleevec down  in light of her symptoms. During the prolonged taper, she has not had evidence of disease relapse. Pending today's testing, if negative will stop imatinib.  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 GVHD: No active GVHD   --Mentioned mild dry/irritated eyes lately, no abnormalities noted on exam, will continue to monitor  --History of aGVHD of the skin and GI tract initially- resolved with oral steroids. No symptoms currently off steroids  --History of cGVHD: Facial rash (4/13/10) not biopsied due to location + oral mucosa changes. Lip biopsy completed 4/13 and showed mild sialadenitis with focal atrophy suggestive of cGVHD. Not needing any treatment and not active currently      3. ID: no current antibiotics. No recent infections over last year  --S/P 1 year anniversary vaccines 7/10, 14 month boosters 10/10 (including flu shot).  --2 year anniversary vaccines 7/2011      4. GI: no issues noted today; previous flare in heartburn on zantac, Encouraged her to talk with her primary doc about this  --cont omeprazole but has dropped the dose given the studies showing long term kidney impacts.        5. FEN/Renal: stable to improved     6. Health Maintenance: has a primary doctor, Dr. Castellano, back home. Recent evaluation in December 7. Pain control: Being managed by her primary doc     8. Muscle cramps:.Improving with lower imatinib dose     Final Plan:  -if today's BCR-ABL PCR neg, then will call patient to discontinue imatinib  -BCR-ABL PCR monthly x3 to be drawn locally and sent to [facility] to run (Tessy jackson)  -RTC 6 months with labs prior (presuming ongoing negative BCR-ABL PCRs)    Labs and treatment plan reviewed with patient. All questions answered.    In this 30 minutes visit, > 50% spent in counseling and coordinating care.    Patient discussed with Dr. Quiñonez.    Rc Parra ([place])  MD, PhD   Hem/Onc Fellow    Addendum (1/21/18): BCR-ABL major PCR negative, called patient and informed her ok to discontinue imatinib.    Attending Addendum:  The patient was seen and evaluated. All medical records, testing results were reviewed and the plan was discussed with the fellow. The note above has been edited to reflect my findings.    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 She has been maintained on imatinib wit PCR negative status over the last 7 years and has had increasing toxicity so we have been tapering her dose down. Her bcr-abl has remained undetectable over the last 7+ years. She would like to come off and I think this is reasonable given the duration of molecular negativity and the toxicity she is having. Thus, we will stop and follow the CML literature approach of monthly BCR_ABL testing at the time of cessation to catch an early relapse. If she remains negative for 3 months then we will space out to q3 month checks. She is happy with this plan.    Moon Quiñonez

## 2018-01-18 NOTE — NURSING NOTE

## 2018-01-18 NOTE — MR AVS SNAPSHOT
After Visit Summary   1/18/2018    Kat Conteh    MRN: [id number]           Patient Information     Date Of Birth          1960        Visit Information        Provider Department      1/18/2018 11:00 AM Moon Quiñonez MD [facility] Blood and Marrow Transplant        Today's Diagnoses     Acute lymphocytic leukemia in remission ()              [facility] [place] ([facility]  [address] [place] [address]  Phone: [phone]  Clinic Hours:   Monday-Thursday:7am to 7pm   Friday: 7am to 5pm   Weekends and holidays:    8am to noon (in general)  If your fever is 100.5  or greater,   call the clinic.  After hours call the   hospital at [phone] or   [phone]. Ask for the BMT   fellow on-call            Follow-ups after your visit        Your next 10 appointments already scheduled     Jul 19, 2018 11:00 AM CDT   [facility] with  [facility]   [facility] ([facility])    [address] [place] [address]  [address]  [facility] [address] [phone]            Jul 19, 2018 11:30 AM CDT   Return with Moon Quiñonez MD   [facility] Blood and Marrow Transplant ([facility])    [address] [place] [address]  [address]  [facility] [address] [phone]              Who to contact     If you have questions or need follow up information about today's clinic visit or your schedule please contact [facility] BLOOD [facility] MARROW TRANSPL[facility] directly at [phone].  Normal or non-critical lab and imaging results will be communicated to you by MyChart, letter or phone within 4 business days after the clinic has received the results. If you do not hear from us within 7 days, please contact the clinic through MyChart or phone. If you have a critical or abnormal lab result, we will notify you by phone as soon as possible.  Submit refill requests through [employer] or call your pharmacy and they will forward the  "refill request to us. Please allow 3 business days for your refill to be completed.          Additional Information About Your Visit        [employer] Information     [employer] lets you send messages to your doctor, view your test results, renew your prescriptions, schedule appointments and more. To sign up, go to [email or web address] . Click on \"Log in\" on the left side of the screen, which will take you to the Welcome page. Then click on \"Sign up Now\" on the right side of the page.     You will be asked to enter the access code listed below, as well as some personal information. Please follow the directions to create your username and password.     Your access code is: [email or web address]  Expires: 2018  6:30 AM     Your access code will  in 90 days. If you need help or a new code, please call your [facility] clinic or [phone].        Care EveryWhere ID     This is your Care EveryWhere ID. This could be used by other organizations to access your [facility] medical records  [id number]        Your Vitals Were     Pulse Temperature Respirations Pulse Oximetry          61 98  F (36.7  C) (Tympanic) 16 98%         Blood Pressure from Last 3 Encounters:   18 129/76   17 123/74   17 133/64    Weight from Last 3 Encounters:   18 85 kg (187 lb 4.8 oz)   17 84.9 kg (187 lb 1.6 oz)   17 84.3 kg (185 lb 14.4 oz)              We Performed the Following     BCR ABL1 Major Breakpoint Quant p210     CBC with platelets differential     Comprehensive metabolic panel     DNA marker post bmt engraft bld        Recent Review Flowsheet Data     BMT Recent Results Latest Ref Rng & Units 2013    WBC 4.0 - 11.0 10e9/L 7.0 7.1 6.5 7.5 9.1 7.1 7.3    Hemoglobin 11.7 - 15.7 g/dL 12.3 11.8 12.2 12.0 12.8 13.1 13.1    Platelet Count 150 - 450 10e9/L 257 233 238 271 305 311 301    Neutrophils (Absolute) 1.6 - 8.3 10e9/L 3.8 4.1 3.6 4.9 5.2 " 3.5 3.6    INR 0.86 - 1.14 - - - - - - -    Sodium 133 - 144 mmol/L 141 140 143 141 144 141 141    Potassium 3.4 - 5.3 mmol/L 4.0 4.1 4.3 3.7 3.9 3.6 4.0    Chloride 94 - 109 mmol/L 104 102 110(H) 107 110(H) 103 107    Glucose 70 - 99 mg/dL 95 93 94 79 92 99 108(H)    Urea Nitrogen 7 - 30 mg/dL 15 16 17 16 15 23 22    Creatinine 0.52 - 1.04 mg/dL 1.08(H) 1.14(H) 1.15(H) 1.14(H) 1.06(H) 1.14(H) 1.08(H)    Calcium (Total) 8.5 - 10.1 mg/dL 9.1 8.9 8.7 8.6 8.5 8.7 8.6    Protein (Total) 6.8 - 8.8 g/dL 6.7(L) 6.7(L) 6.1(L) 6.7(L) 6.8 6.9 6.8    Albumin 3.4 - 5.0 g/dL 4.0 4.0 3.5 3.6 3.7 3.6 3.5    Alkaline Phosphatase 40 - 150 U/L 110 92 108 104 108 110 100    AST 0 - 45 U/L 31 30 21 22 25 16 17    ALT 0 - 50 U/L 32 33 25 22 23 21 20    MCV 78 - 100 fl 101(H) 100 101(H) 103(H) 98 99 95               Primary Care Provider Office Phone # Fax #    Duane Eric [facility] [phone]       [address] [address]        Equal Access to Services     [facility] AH: Hadii april Hook, wanicolasada luqadaha, qaybta kaalmada florencio, gallito franco. So [facility] [phone].    ATENCIÓN: Si habla español, tiene a borden disposición servicios gratuitos de asistencia lingüística. Llame al [phone].    We comply with applicable federal civil rights laws and [place] laws. We do not discriminate on the basis of race, color, national origin, age, disability, sex, sexual orientation, or gender identity.            Thank you!     Thank you for choosing [facility] BLOOD [facility] MARROW TRANSPL[facility]  for your care. Our goal is always to provide you with excellent care. Hearing back from our patients is one way we can continue to improve our services. Please take a few minutes to complete the written survey that you may receive in the mail after your visit with us. Thank you!             Your Updated Medication List - Protect others around you: Learn how to safely use,  store and throw away your medicines at www.disposemymeds.org.          This list is accurate as of: 1/18/18 12:02 PM.  Always use your most recent med list.                   Brand Name Dispense Instructions for use Diagnosis    fentaNYL 25 mcg/hr 72 hr patch    DURAGESIC    10 patch    Place 1 patch onto the skin every 72 hours.    ALL (acute lymphocytic leukemia) (H), Radiating back pain       furosemide 20 MG tablet    LASIX    30 tablet    Take 1 tablet (20 mg) by mouth daily AS NEEDED    ALL (acute lymphocytic leukemia) (H), Status post bone marrow transplant (H)       GLEEVEC 100 MG tablet   Generic drug:  imatinib     60 tablet    Take 1 tablet (100 mg) by mouth daily    Acute lymphocytic leukemia in remission (H)       OMEPRAZOLE PO      Take 20 mg by mouth        oxyCODONE IR 5 MG tablet    ROXICODONE    120 tablet    Take 1-2 tablets by mouth every 4 hours as needed. Take 1-2 tablets q4-6 hours prn pain. Disp. 120    ALL (acute lymphocytic leukemia) (H), Radiating back pain       vitamin D3 71841 UNITS capsule    CHOLECALCIFEROL     Take 5,000 Units by mouth daily        ZANTAC PO      Take 150 mg by mouth 2 times daily

## 2018-01-18 NOTE — NURSING NOTE
Chief Complaint   Patient presents with     Blood Draw     vitals done and labs by DARWIN Ferreira MA

## 2018-01-18 NOTE — PROGRESS NOTES
Attention [facility], p: [phone] f: [phone]    Kat Conteh   : 1960  Diagnosis: ALL (C91.01)  MD: Dr Moon Quiñonez     Please draw the following labs monthly:    -BCR ABL Major Breakpoint Quantitative (p210)  -CBC with platelets and diff    **The BCR ABL specimen will need to be mailed back to us at the [facility] to run the sample. Patient will bring kit with a lavender EDTA tube to your lab monthly. There will be mailing instructions included in the kit. Call [phone] with any questions.    **Please fax the CBC results to [phone]    Thank you,        Dr Moon Quiñonez    [facility]  Blood and Marrow Transplant Program  [address] [place] [address] [address]  [place] [address]    Phone: [phone]  Fax: [phone]

## 2018-01-19 LAB — COPATH REPORT: NORMAL

## 2018-01-22 LAB
COPATH REPORT: NORMAL
COPATH REPORT: NORMAL

## 2018-02-22 ENCOUNTER — TRANSFERRED RECORDS (OUTPATIENT)
Dept: HEALTH INFORMATION MANAGEMENT | Facility: CLINIC | Age: 58
End: 2018-02-22

## 2018-02-26 DIAGNOSIS — C91.01 ACUTE LYMPHOCYTIC LEUKEMIA IN REMISSION (H): ICD-10-CM

## 2018-02-28 ENCOUNTER — TRANSFERRED RECORDS (OUTPATIENT)
Dept: HEALTH INFORMATION MANAGEMENT | Facility: CLINIC | Age: 58
End: 2018-02-28

## 2018-02-28 DIAGNOSIS — C91.01 ACUTE LYMPHOCYTIC LEUKEMIA IN REMISSION (H): ICD-10-CM

## 2018-02-28 PROCEDURE — 81206 BCR/ABL1 GENE MAJOR BP: CPT | Performed by: INTERNAL MEDICINE

## 2018-03-02 LAB — COPATH REPORT: NORMAL

## 2018-03-09 LAB — COPATH REPORT: NORMAL

## 2018-03-10 ENCOUNTER — TELEPHONE (OUTPATIENT)
Dept: OTHER | Facility: CLINIC | Age: 58
End: 2018-03-10

## 2018-03-10 NOTE — TELEPHONE ENCOUNTER
BCR-ABL undetectable. Called Kat with the results. Repeat again in 1 month given recent gleevec cessation.    Moon Quiñonez

## 2018-04-24 ENCOUNTER — TRANSFERRED RECORDS (OUTPATIENT)
Dept: HEALTH INFORMATION MANAGEMENT | Facility: CLINIC | Age: 58
End: 2018-04-24

## 2018-04-24 DIAGNOSIS — C91.01 ACUTE LYMPHOCYTIC LEUKEMIA IN REMISSION (H): ICD-10-CM

## 2018-04-27 ENCOUNTER — HOSPITAL ENCOUNTER (OUTPATIENT)
Facility: CLINIC | Age: 58
Setting detail: SPECIMEN
Discharge: HOME | End: 2018-04-27
Admitting: INTERNAL MEDICINE
Payer: MEDICARE

## 2018-04-27 PROCEDURE — 81206 BCR/ABL1 GENE MAJOR BP: CPT | Performed by: INTERNAL MEDICINE

## 2018-05-04 LAB — COPATH REPORT: NORMAL

## 2018-05-06 ENCOUNTER — TELEPHONE (OUTPATIENT)
Dept: OTHER | Facility: CLINIC | Age: 58
End: 2018-05-06

## 2018-05-06 NOTE — TELEPHONE ENCOUNTER
Called Kat to let her know her BCR-ABL remains undetectable. Got her voicemail but left a message with the good results by prior permission.    Moon Quiñonez

## 2018-07-18 PROBLEM — Z94.81 S/P ALLOGENEIC BONE MARROW TRANSPLANT (H): Status: ACTIVE | Noted: 2018-07-18

## 2018-07-19 ENCOUNTER — ONCOLOGY VISIT (OUTPATIENT)
Dept: TRANSPLANT | Facility: CLINIC | Age: 58
End: 2018-07-19
Attending: INTERNAL MEDICINE
Payer: MEDICARE

## 2018-07-19 ENCOUNTER — APPOINTMENT (OUTPATIENT)
Dept: LAB | Facility: CLINIC | Age: 58
End: 2018-07-19
Attending: INTERNAL MEDICINE
Payer: MEDICARE

## 2018-07-19 VITALS
HEART RATE: 75 BPM | WEIGHT: 183.8 LBS | HEIGHT: 62 IN | TEMPERATURE: 97.3 F | DIASTOLIC BLOOD PRESSURE: 72 MMHG | OXYGEN SATURATION: 95 % | RESPIRATION RATE: 18 BRPM | BODY MASS INDEX: 33.82 KG/M2 | SYSTOLIC BLOOD PRESSURE: 124 MMHG

## 2018-07-19 DIAGNOSIS — Z94.81 S/P ALLOGENEIC BONE MARROW TRANSPLANT (H): ICD-10-CM

## 2018-07-19 DIAGNOSIS — C91.01 ACUTE LYMPHOCYTIC LEUKEMIA IN REMISSION (H): Primary | ICD-10-CM

## 2018-07-19 LAB
ALBUMIN SERPL-MCNC: 3.8 G/DL (ref 3.4–5)
ALP SERPL-CCNC: 124 U/L (ref 40–150)
ALT SERPL W P-5'-P-CCNC: 28 U/L (ref 0–50)
ANION GAP SERPL CALCULATED.3IONS-SCNC: 6 MMOL/L (ref 3–14)
AST SERPL W P-5'-P-CCNC: 21 U/L (ref 0–45)
BASOPHILS # BLD AUTO: 0.1 10E9/L (ref 0–0.2)
BASOPHILS NFR BLD AUTO: 0.9 %
BILIRUB SERPL-MCNC: 0.3 MG/DL (ref 0.2–1.3)
BUN SERPL-MCNC: 23 MG/DL (ref 7–30)
CALCIUM SERPL-MCNC: 9.1 MG/DL (ref 8.5–10.1)
CHLORIDE SERPL-SCNC: 106 MMOL/L (ref 94–109)
CO2 SERPL-SCNC: 28 MMOL/L (ref 20–32)
CREAT SERPL-MCNC: 1.06 MG/DL (ref 0.52–1.04)
DIFFERENTIAL METHOD BLD: NORMAL
EOSINOPHIL # BLD AUTO: 0.3 10E9/L (ref 0–0.7)
EOSINOPHIL NFR BLD AUTO: 3.7 %
ERYTHROCYTE [DISTWIDTH] IN BLOOD BY AUTOMATED COUNT: 14.3 % (ref 10–15)
GFR SERPL CREATININE-BSD FRML MDRD: 53 ML/MIN/1.7M2
GLUCOSE SERPL-MCNC: 100 MG/DL (ref 70–99)
HCT VFR BLD AUTO: 42.3 % (ref 35–47)
HGB BLD-MCNC: 13.8 G/DL (ref 11.7–15.7)
IMM GRANULOCYTES # BLD: 0 10E9/L (ref 0–0.4)
IMM GRANULOCYTES NFR BLD: 0.5 %
LYMPHOCYTES # BLD AUTO: 3 10E9/L (ref 0.8–5.3)
LYMPHOCYTES NFR BLD AUTO: 34.2 %
MCH RBC QN AUTO: 30.3 PG (ref 26.5–33)
MCHC RBC AUTO-ENTMCNC: 32.6 G/DL (ref 31.5–36.5)
MCV RBC AUTO: 93 FL (ref 78–100)
MONOCYTES # BLD AUTO: 1 10E9/L (ref 0–1.3)
MONOCYTES NFR BLD AUTO: 11 %
NEUTROPHILS # BLD AUTO: 4.4 10E9/L (ref 1.6–8.3)
NEUTROPHILS NFR BLD AUTO: 49.7 %
NRBC # BLD AUTO: 0 10*3/UL
NRBC BLD AUTO-RTO: 0 /100
PLATELET # BLD AUTO: 335 10E9/L (ref 150–450)
POTASSIUM SERPL-SCNC: 3.8 MMOL/L (ref 3.4–5.3)
PROT SERPL-MCNC: 7.4 G/DL (ref 6.8–8.8)
RBC # BLD AUTO: 4.55 10E12/L (ref 3.8–5.2)
SODIUM SERPL-SCNC: 139 MMOL/L (ref 133–144)
WBC # BLD AUTO: 8.9 10E9/L (ref 4–11)

## 2018-07-19 PROCEDURE — G0463 HOSPITAL OUTPT CLINIC VISIT: HCPCS | Mod: ZF

## 2018-07-19 PROCEDURE — 81206 BCR/ABL1 GENE MAJOR BP: CPT | Performed by: INTERNAL MEDICINE

## 2018-07-19 PROCEDURE — 36415 COLL VENOUS BLD VENIPUNCTURE: CPT

## 2018-07-19 PROCEDURE — 85025 COMPLETE CBC W/AUTO DIFF WBC: CPT | Performed by: INTERNAL MEDICINE

## 2018-07-19 PROCEDURE — 80053 COMPREHEN METABOLIC PANEL: CPT | Performed by: INTERNAL MEDICINE

## 2018-07-19 ASSESSMENT — PAIN SCALES - GENERAL: PAINLEVEL: MODERATE PAIN (5)

## 2018-07-19 NOTE — MR AVS SNAPSHOT
After Visit Summary   7/19/2018    Kat Conteh    MRN: [id number]           Patient Information     Date Of Birth          1960        Visit Information        Provider Department      7/19/2018 11:30 AM Moon Quiñonez MD [facility] Blood and Marrow Transplant        Today's Diagnoses     Acute lymphocytic leukemia in remission (H)    -  1    S/P allogeneic bone marrow transplant (H)              [facility] [place]  [address] [place] [address]  Phone: [phone]  Clinic Hours:   Monday-Thursday:7am to 7pm   Friday: 7am to 5pm   Weekends and holidays:    8am to noon (in general)  If your fever is 100.5  or greater,   call the clinic.  After hours call the   hospital at [phone] or   [phone]. Ask for the BMT   fellow on-call           Care Instructions    LABS AT HOME IN 3 months    [place]lamide bmt in 6 months and 1 year with labs prior          Follow-ups after your visit        Your next 10 appointments already scheduled     Jan 17, 2019 10:45 AM CST   [facility] Lab Draw with  [facility]RAW   [facility] Draw ([facility])    [address] [place] [address]  [address] [place] [id number] [phone]            Jan 17, 2019 11:30 AM CST   Return with Moon Quiñonez MD   [facility] Blood and Marrow Transplant ([facility])    [address] [place] [address]  [address] [place] [id number] [phone]            Jul 18, 2019 10:45 AM CDT   [facility] Draw with [facility]   [facility] Draw ([facility])    [address] [place] [id number] [phone]            Jul 18, 2019 11:30 AM CDT   Return with Moon Quiñonez MD   [facility] Blood and Marrow Transplant ([facility])    [address] [place] [id number] [phone]              Future tests that were ordered  "for you today     Open Future Orders        Priority Expected Expires Ordered    BCR ABL1 Major Breakpoint Quant p210 Routine 10/15/2018 2018 2018    Comprehensive metabolic panel Routine 2019    CBC with platelets differential Routine 2019    BCR ABL1 Major Breakpoint Quant p210 Routine 2019            Who to contact     If you have questions or need follow up information about today's clinic visit or your schedule please contact [facility] BLOOD AND MARROW TRANSPLANT directly at [phone].  Normal or non-critical lab and imaging results will be communicated to you by MyWobilehart, letter or phone within 4 business days after the clinic has received the results. If you do not hear from us within 7 days, please contact the clinic through MyWobilehart or phone. If you have a critical or abnormal lab result, we will notify you by phone as soon as possible.  Submit refill requests through Frogdice or call your pharmacy and they will forward the refill request to us. Please allow 3 business days for your refill to be completed.          Additional Information About Your Visit        MyWobileharMeriTaleem Information     Frogdice lets you send messages to your doctor, view your test results, renew your prescriptions, schedule appointments and more. To sign up, go to [email or web address] . Click on \"Log in\" on the left side of the screen, which will take you to the Welcome page. Then click on \"Sign up Now\" on the right side of the page.     You will be asked to enter the access code listed below, as well as some personal information. Please follow the directions to create your username and password.     Your access code is: [email or web address]  Expires: 10/3/2018  6:31 AM     Your access code will  in 90 days. If you need help or a new code, please call your [place] clinic or [phone].        Care EveryWhere ID     This is your Care EveryWhere ID. This " "could be used by other organizations to access your [place] medical records  [id number]        Your Vitals Were     Pulse Temperature Respirations Height Pulse Oximetry BMI (Body Mass Index)    75 97.3  F (36.3  C) (Oral) 18 1.575 m (5' 2\") 95% 33.62 kg/m2       Blood Pressure from Last 3 Encounters:   07/19/18 124/72   01/18/18 129/76   07/20/17 123/74    Weight from Last 3 Encounters:   07/19/18 83.4 kg (183 lb 12.8 oz)   01/18/18 85 kg (187 lb 4.8 oz)   07/20/17 84.9 kg (187 lb 1.6 oz)              We Performed the Following     BCR ABL1 Major Breakpoint Quant p210     CBC with platelets differential     Comprehensive metabolic panel        Recent Review Flowsheet Data     BMT Recent Results Latest Ref Rng & Units 7/7/2014 7/23/2015 7/21/2016 1/19/2017 7/20/2017 1/18/2018 7/19/2018    WBC 4.0 - 11.0 10e9/L 7.1 6.5 7.5 9.1 7.1 7.3 8.9    Hemoglobin 11.7 - 15.7 g/dL 11.8 12.2 12.0 12.8 13.1 13.1 13.8    Platelet Count 150 - 450 10e9/L 233 238 271 305 311 301 335    Neutrophils (Absolute) 1.6 - 8.3 10e9/L 4.1 3.6 4.9 5.2 3.5 3.6 4.4    INR 0.86 - 1.14 - - - - - - -    Sodium 133 - 144 mmol/L 140 143 141 144 141 141 139    Potassium 3.4 - 5.3 mmol/L 4.1 4.3 3.7 3.9 3.6 4.0 3.8    Chloride 94 - 109 mmol/L 102 110(H) 107 110(H) 103 107 106    Glucose 70 - 99 mg/dL 93 94 79 92 99 108(H) 100(H)    Urea Nitrogen 7 - 30 mg/dL 16 17 16 15 23 22 23    Creatinine 0.52 - 1.04 mg/dL 1.14(H) 1.15(H) 1.14(H) 1.06(H) 1.14(H) 1.08(H) 1.06(H)    Calcium (Total) 8.5 - 10.1 mg/dL 8.9 8.7 8.6 8.5 8.7 8.6 9.1    Protein (Total) 6.8 - 8.8 g/dL 6.7(L) 6.1(L) 6.7(L) 6.8 6.9 6.8 7.4    Albumin 3.4 - 5.0 g/dL 4.0 3.5 3.6 3.7 3.6 3.5 3.8    Alkaline Phosphatase 40 - 150 U/L 92 108 104 108 110 100 124    AST 0 - 45 U/L 30 21 22 25 16 17 21    ALT 0 - 50 U/L 33 25 22 23 21 20 28    MCV 78 - 100 fl 100 101(H) 103(H) 98 99 95 93               Primary Care Provider Office Phone # Fax #    Duane Eric Asael [phone]       " [address] [address]        Equal Access to Services     IAM GILLIAM : Hadii aad ku hadclaycesilia [place], wanicolasada kera, qanoryta fidenciomagallito feliciano. So [facility] [phone].    ATENCIÓN: Si habla español, tiene a borden disposición servicios gratuitos de asistencia lingüística. Llame al [phone].    We comply with applicable federal civil rights laws and [place] laws. We do not discriminate on the basis of race, color, national origin, age, disability, sex, sexual orientation, or gender identity.            Thank you!     Thank you for choosing [facility] BLOOD AND MARROW TRANSPLANT  for your care. Our goal is always to provide you with excellent care. Hearing back from our patients is one way we can continue to improve our services. Please take a few minutes to complete the written survey that you may receive in the mail after your visit with us. Thank you!             Your Updated Medication List - Protect others around you: Learn how to safely use, store and throw away your medicines at www.disposemymeds.org.          This list is accurate as of 7/19/18 11:59 PM.  Always use your most recent med list.                   Brand Name Dispense Instructions for use Diagnosis    fentaNYL 25 mcg/hr 72 hr patch    DURAGESIC    10 patch    Place 1 patch onto the skin every 72 hours.    ALL (acute lymphocytic leukemia) (H), Radiating back pain       furosemide 20 MG tablet    LASIX    30 tablet    Take 1 tablet (20 mg) by mouth daily AS NEEDED    ALL (acute lymphocytic leukemia) (H), Status post bone marrow transplant (H)       GLEEVEC 100 MG tablet   Generic drug:  imatinib     60 tablet    Take 1 tablet (100 mg) by mouth daily    Acute lymphocytic leukemia in remission (H)       OMEPRAZOLE PO      Take 20 mg by mouth    Acute lymphocytic leukemia in remission (H)       oxyCODONE IR 5 MG tablet    ROXICODONE    120 tablet    Take 1-2 tablets by  mouth every 4 hours as needed. Take 1-2 tablets q4-6 hours prn pain. Disp. 120    ALL (acute lymphocytic leukemia) (H), Radiating back pain       vitamin D3 32909 UNITS capsule    CHOLECALCIFEROL     Take 5,000 Units by mouth daily        ZANTAC PO      Take 150 mg by mouth 2 times daily

## 2018-07-19 NOTE — NURSING NOTE
"Oncology Rooming Note    July 19, 2018 11:45 AM   Kat Conteh is a 58 year old female who presents for:    Chief Complaint   Patient presents with     Blood Draw     vitals/ by MA     RECHECK     Acute lymphocytic leukemia in remission      Initial Vitals: /72 (BP Location: Right arm, Patient Position: Sitting, Cuff Size: Adult Regular)  Pulse 75  Temp 97.3  F (36.3  C) (Oral)  Resp 18  Ht 1.575 m (5' 2\")  Wt 83.4 kg (183 lb 12.8 oz)  SpO2 95%  BMI 33.62 kg/m2 Estimated body mass index is 33.62 kg/(m^2) as calculated from the following:    Height as of this encounter: 1.575 m (5' 2\").    Weight as of this encounter: 83.4 kg (183 lb 12.8 oz). Body surface area is 1.91 meters squared.  Moderate Pain (5) Comment: lower back - OXYCODONE @ 7AM    No LMP recorded. Patient is postmenopausal.  Allergies reviewed: Yes  Medications reviewed: Yes    Medications: Medication refills not needed today.  Pharmacy name entered into [employer]: [id number] [address]    Clinical concerns:  No new concerns  Provider was notified.    5 minutes for nursing intake (face to face time)     Marie Fernandez MA             "

## 2018-07-19 NOTE — PROGRESS NOTES
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 She obtained her repeat CR with imatinib alone and no DLI. We did collect lymphocytes for potential DLI should the need arise. She remained on imatinib until 1/2018 given ongoing molecular remission and 100% donor.       HPI: Don is doing well.  She is so happy being off the imatinib.  The muscle cramps are gone, the nausea is gone, and she is just overall feeling better.  She has had no major changes in health since I last saw her and no new medications.  No GI symptoms, no rashes, no GVH symptoms.  Her only complaint is poor vision but she states she just needs new glasses.      10 point ROS otherwise negative    Physical Exam:  Blood pressure 124/72, pulse 75, temperature 97.3  F (36.3  C), temperature source Oral, height 1.575 m (5' 2\"), weight 83.4 kg (183 lb 12.8 oz), SpO2 95 %.    ECOG PS: 0  Gen: Doing well. NAD  HEENT:  OP clear, no thrush, no GVHD symptoms  Lungs: CTAB, no w/r/r  Cardiovascular: RRR, S1, S2, no m/r/g  Abdomen: +BS, soft, nontender, nondistended  MSK/Extremities:no edema  LN: no cervical, supraclavicular, axillary,  lymphadenopathy    Labs:  Results for KEYSHA PAGE (MRN [id number]) as of 7/19/2018 11:43   Ref. Range 7/19/2018 11:03   WBC Latest Ref Range: 4.0 - 11.0 10e9/L 8.9   Hemoglobin Latest Ref Range: 11.7 - 15.7 g/dL 13.8   Hematocrit Latest Ref Range: 35.0 - 47.0 % 42.3   Platelet Count Latest Ref Range: 150 - 450 10e9/L 335   RBC Count Latest Ref Range: 3.8 - 5.2 10e12/L 4.55   MCV Latest Ref Range: 78 - 100 fl 93   MCH Latest Ref Range: 26.5 - 33.0 pg 30.3   MCHC Latest Ref Range: 31.5 - 36.5 g/dL 32.6   RDW Latest Ref Range: 10.0 - 15.0 % 14.3   Diff Method Unknown Automated Method   % Neutrophils Latest Units: % 49.7   % Lymphocytes Latest Units: % 34.2   % Monocytes Latest Units: % 11.0   % " Eosinophils Latest Units: % 3.7   % Basophils Latest Units: % 0.9   % Immature Granulocytes Latest Units: % 0.5   Nucleated RBCs Latest Ref Range: 0 /100 0   Absolute Neutrophil Latest Ref Range: 1.6 - 8.3 10e9/L 4.4   Absolute Lymphocytes Latest Ref Range: 0.8 - 5.3 10e9/L 3.0   Absolute Monocytes Latest Ref Range: 0.0 - 1.3 10e9/L 1.0   Absolute Eosinophils Latest Ref Range: 0.0 - 0.7 10e9/L 0.3   Absolute Basophils Latest Ref Range: 0.0 - 0.2 10e9/L 0.1   Abs Immature Granulocytes Latest Ref Range: 0 - 0.4 10e9/L 0.0   Absolute Nucleated RBC Unknown 0.0     BR-ABL pending    A/P:    1. 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  --2 year anniversary visit (7/2011) showed no marrow evidence of ALL and 100% donor. BCR-ABL PCR on blood undetectable. FISH testing on marrow undetectable.  -- 3 -7 year anniversary visit with no evidence of disease  -- Given 100% donor (last checked 1/2018, pending today) and bcr-abl negative for multiple years we have been trying to taper the gleevec down  in light of her symptoms. During the prolonged taper, she has not had evidence of disease relapse. 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 Repeat today pending. Continue every 3 months for the next 9 months and if remains undetectable will space out to every 6 months    2. 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 No symptoms currently off steroids  --History of cGVHD: Facial rash (4/13/10) not biopsied due to location + oral mucosa changes. Lip biopsy completed  4/13 and showed mild sialadenitis with focal atrophy suggestive of cGVHD. Not needing any treatment and not active currently      3. ID: no current antibiotics. No recent infections over last year  --S/P 1 year anniversary vaccines 7/10, 14 month boosters 10/10 (including flu shot).  --2 year anniversary vaccines 7/2011      4. GI: no issues noted today;          5. FEN/Renal: stable to improved     6. Health Maintenance: has a primary doctor, Dr. Castellano, back home.      7. Pain control: Being managed by her primary doc     8. Muscle cramps: resolved off imatinib     Final Plan:  LABS AT HOME IN 3 months    [place] bmt in 6 months and 1 year with labs prior    Moon Quiñonez    Addendum: BCR-ABL undetectable. Will let Kat know    Moon Quiñonez

## 2018-07-20 LAB — COPATH REPORT: NORMAL

## 2018-10-29 ENCOUNTER — TRANSFERRED RECORDS (OUTPATIENT)
Dept: HEALTH INFORMATION MANAGEMENT | Facility: CLINIC | Age: 58
End: 2018-10-29

## 2018-10-29 DIAGNOSIS — C91.01 ACUTE LYMPHOCYTIC LEUKEMIA IN REMISSION (H): ICD-10-CM

## 2018-11-02 LAB — COPATH REPORT: NORMAL

## 2018-11-06 ENCOUNTER — TRANSFERRED RECORDS (OUTPATIENT)
Dept: HEALTH INFORMATION MANAGEMENT | Facility: CLINIC | Age: 58
End: 2018-11-06

## 2018-11-06 DIAGNOSIS — C91.01 ACUTE LYMPHOCYTIC LEUKEMIA IN REMISSION (H): ICD-10-CM

## 2018-11-06 PROCEDURE — 81206 BCR/ABL1 GENE MAJOR BP: CPT | Performed by: INTERNAL MEDICINE

## 2018-11-09 LAB — COPATH REPORT: NORMAL

## 2018-11-10 ENCOUNTER — TELEPHONE (OUTPATIENT)
Dept: OTHER | Facility: CLINIC | Age: 58
End: 2018-11-10

## 2018-11-10 NOTE — TELEPHONE ENCOUNTER
Repeat BCR-ABL off imatinib remains undetectable. Will inform Kat of these good results.    Next apt in 1/2019 as planned    Moon Quiñonez

## 2019-01-17 ENCOUNTER — ONCOLOGY VISIT (OUTPATIENT)
Dept: TRANSPLANT | Facility: CLINIC | Age: 59
End: 2019-01-17
Attending: INTERNAL MEDICINE
Payer: MEDICARE

## 2019-01-17 ENCOUNTER — APPOINTMENT (OUTPATIENT)
Dept: LAB | Facility: CLINIC | Age: 59
End: 2019-01-17
Attending: INTERNAL MEDICINE
Payer: MEDICARE

## 2019-01-17 VITALS
HEART RATE: 84 BPM | WEIGHT: 182.4 LBS | HEIGHT: 62 IN | BODY MASS INDEX: 33.57 KG/M2 | OXYGEN SATURATION: 92 % | RESPIRATION RATE: 16 BRPM | TEMPERATURE: 97.6 F | DIASTOLIC BLOOD PRESSURE: 72 MMHG | SYSTOLIC BLOOD PRESSURE: 127 MMHG

## 2019-01-17 DIAGNOSIS — Z94.81 S/P ALLOGENEIC BONE MARROW TRANSPLANT (H): ICD-10-CM

## 2019-01-17 DIAGNOSIS — C91.01 ACUTE LYMPHOBLASTIC LEUKEMIA IN REMISSION (H): ICD-10-CM

## 2019-01-17 DIAGNOSIS — C91.01 ACUTE LYMPHOCYTIC LEUKEMIA IN REMISSION (H): ICD-10-CM

## 2019-01-17 LAB
ALBUMIN SERPL-MCNC: 3.7 G/DL (ref 3.4–5)
ALP SERPL-CCNC: 133 U/L (ref 40–150)
ALT SERPL W P-5'-P-CCNC: 25 U/L (ref 0–50)
ANION GAP SERPL CALCULATED.3IONS-SCNC: 8 MMOL/L (ref 3–14)
AST SERPL W P-5'-P-CCNC: 18 U/L (ref 0–45)
BASOPHILS # BLD AUTO: 0.1 10E9/L (ref 0–0.2)
BASOPHILS NFR BLD AUTO: 0.9 %
BILIRUB SERPL-MCNC: 0.2 MG/DL (ref 0.2–1.3)
BUN SERPL-MCNC: 26 MG/DL (ref 7–30)
CALCIUM SERPL-MCNC: 9.2 MG/DL (ref 8.5–10.1)
CHLORIDE SERPL-SCNC: 102 MMOL/L (ref 94–109)
CO2 SERPL-SCNC: 30 MMOL/L (ref 20–32)
CREAT SERPL-MCNC: 1.09 MG/DL (ref 0.52–1.04)
DIFFERENTIAL METHOD BLD: NORMAL
EOSINOPHIL # BLD AUTO: 0.3 10E9/L (ref 0–0.7)
EOSINOPHIL NFR BLD AUTO: 2.9 %
ERYTHROCYTE [DISTWIDTH] IN BLOOD BY AUTOMATED COUNT: 13.9 % (ref 10–15)
GFR SERPL CREATININE-BSD FRML MDRD: 56 ML/MIN/{1.73_M2}
GLUCOSE SERPL-MCNC: 90 MG/DL (ref 70–99)
HCT VFR BLD AUTO: 44.6 % (ref 35–47)
HGB BLD-MCNC: 14.3 G/DL (ref 11.7–15.7)
IMM GRANULOCYTES # BLD: 0 10E9/L (ref 0–0.4)
IMM GRANULOCYTES NFR BLD: 0.3 %
LYMPHOCYTES # BLD AUTO: 3.2 10E9/L (ref 0.8–5.3)
LYMPHOCYTES NFR BLD AUTO: 33.1 %
MCH RBC QN AUTO: 29.9 PG (ref 26.5–33)
MCHC RBC AUTO-ENTMCNC: 32.1 G/DL (ref 31.5–36.5)
MCV RBC AUTO: 93 FL (ref 78–100)
MONOCYTES # BLD AUTO: 1 10E9/L (ref 0–1.3)
MONOCYTES NFR BLD AUTO: 9.8 %
NEUTROPHILS # BLD AUTO: 5.2 10E9/L (ref 1.6–8.3)
NEUTROPHILS NFR BLD AUTO: 53 %
NRBC # BLD AUTO: 0 10*3/UL
NRBC BLD AUTO-RTO: 0 /100
PLATELET # BLD AUTO: 387 10E9/L (ref 150–450)
POTASSIUM SERPL-SCNC: 3.8 MMOL/L (ref 3.4–5.3)
PROT SERPL-MCNC: 7.7 G/DL (ref 6.8–8.8)
RBC # BLD AUTO: 4.79 10E12/L (ref 3.8–5.2)
SODIUM SERPL-SCNC: 139 MMOL/L (ref 133–144)
WBC # BLD AUTO: 9.7 10E9/L (ref 4–11)

## 2019-01-17 PROCEDURE — 36415 COLL VENOUS BLD VENIPUNCTURE: CPT

## 2019-01-17 PROCEDURE — 81206 BCR/ABL1 GENE MAJOR BP: CPT | Performed by: INTERNAL MEDICINE

## 2019-01-17 PROCEDURE — 80053 COMPREHEN METABOLIC PANEL: CPT | Performed by: INTERNAL MEDICINE

## 2019-01-17 PROCEDURE — G0463 HOSPITAL OUTPT CLINIC VISIT: HCPCS | Mod: ZF

## 2019-01-17 PROCEDURE — 85025 COMPLETE CBC W/AUTO DIFF WBC: CPT | Performed by: INTERNAL MEDICINE

## 2019-01-17 ASSESSMENT — MIFFLIN-ST. JEOR: SCORE: 1355.61

## 2019-01-17 ASSESSMENT — PAIN SCALES - GENERAL: PAINLEVEL: NO PAIN (0)

## 2019-01-17 NOTE — PROGRESS NOTES
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 She obtained her repeat CR with imatinib alone and no DLI. We did collect lymphocytes for potential DLI should the need arise. She remained on imatinib until 1/2018 given ongoing molecular remission and 100% donor.       HPI: Kat is doing well.  She has been off imatinib since 1/2018 with ongoing molecular CR and 100% donor. Today she notes she is doing well. Muscle cramps so much better after being off gleevec. No recent infections. Seeing her primary doc on a regular basis. Noted recent increased cholesterol and was started on fish oil. No cardiac issues, no pulmonary issues. Notes mild dry eyes but no dry mouth     10 point ROS otherwise negative    Physical Exam:  Blood pressure 127/72, pulse 84, temperature 97.6  F (36.4  C), resp. rate 16, weight 82.7 kg (182 lb 6.4 oz), SpO2 92 %.    ECOG PS: 0  Gen: Doing well. NAD  HEENT:  OP clear, no thrush, no GVHD symptoms. Poor dentition  Lungs: CTAB, no w/r/r  Cardiovascular: RRR, S1, S2, no m/r/g  Abdomen: +BS, soft, nontender, nondistended  MSK/Extremities:no edema  LN: no LASHANDA    Labs:  Results for KAT PAGE (MRN [id number]) as of 1/17/2019 10:44   Ref. Range 1/17/2019 10:19   WBC Latest Ref Range: 4.0 - 11.0 10e9/L 9.7   Hemoglobin Latest Ref Range: 11.7 - 15.7 g/dL 14.3   Hematocrit Latest Ref Range: 35.0 - 47.0 % 44.6   Platelet Count Latest Ref Range: 150 - 450 10e9/L 387   RBC Count Latest Ref Range: 3.8 - 5.2 10e12/L 4.79   MCV Latest Ref Range: 78 - 100 fl 93   MCH Latest Ref Range: 26.5 - 33.0 pg 29.9   MCHC Latest Ref Range: 31.5 - 36.5 g/dL 32.1   RDW Latest Ref Range: 10.0 - 15.0 % 13.9   Diff Method Unknown Automated Method   % Neutrophils Latest Units: % 53.0   % Lymphocytes Latest Units: % 33.1   % Monocytes Latest Units: % 9.8   % Eosinophils Latest Units: % 2.9    % Basophils Latest Units: % 0.9   % Immature Granulocytes Latest Units: % 0.3   Nucleated RBCs Latest Ref Range: 0 /100 0   Absolute Neutrophil Latest Ref Range: 1.6 - 8.3 10e9/L 5.2   Absolute Lymphocytes Latest Ref Range: 0.8 - 5.3 10e9/L 3.2   Absolute Monocytes Latest Ref Range: 0.0 - 1.3 10e9/L 1.0   Absolute Eosinophils Latest Ref Range: 0.0 - 0.7 10e9/L 0.3   Absolute Basophils Latest Ref Range: 0.0 - 0.2 10e9/L 0.1   Abs Immature Granulocytes Latest Ref Range: 0 - 0.4 10e9/L 0.0   Absolute Nucleated RBC Unknown 0.0       11/6/2018 BCR-ABL undetectable    BCR-ABL pending from today    A/P:    1. 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  --2 year anniversary visit (7/2011) showed no marrow evidence of ALL and 100% donor. BCR-ABL PCR on blood undetectable. FISH testing on marrow undetectable.  -- 3 -7 year anniversary visit with no evidence of disease  -- Given 100% donor (last checked 1/2018, pending today) and bcr-abl negative for multiple years we have been trying to taper the gleevec down  in light of her symptoms. During the prolonged taper, she has not had evidence of disease relapse. 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 Then spaced out to every 3 months and repeat today. If remains undetectable will repeat in July as planned.     2. 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 No symptoms currently off steroids  --History of cGVHD: Facial rash (4/13/10) not biopsied due to location + oral mucosa changes. Lip biopsy completed 4/13  and showed mild sialadenitis with focal atrophy suggestive of cGVHD. Not needing any treatment and not active currently      3. ID: no current antibiotics. No recent infections over last year  --S/P 1 year anniversary vaccines 7/10, 14 month boosters 10/10 (including flu shot).  --2 year anniversary vaccines 7/2011      4. GI: no issues noted today         5. FEN/Renal: stable to improved     6. Health Maintenance: has a primary doctor, Dr. Dangelo, back home.      7. Pain control: Being managed by her primary doc     8. Muscle cramps: resolved off imatinib     Final Plan:  - follow-up BCR-ABL  - Olamide in 7/2019 with labs then space out to annual if bcr-abl remains undetectable    Moon Quiñonez    Addendum 1/21/2019: BCR-ABL undetectable. Next check in 6 months as planned. Will inform ochoa.    Moon Quiñonez

## 2019-01-17 NOTE — LETTER
1/17/2019       RE: Kat Conteh  [address] [address]  [facility] [address]     Dear Colleague,    Thank you for referring your patient, Kat Conteh, to the [facility] BLOOD AND MARROW TRANSPLANT. Please see a copy of my visit note below.    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 She obtained her repeat CR with imatinib alone and no DLI. We did collect lymphocytes for potential DLI should the need arise. She remained on imatinib until 1/2018 given ongoing molecular remission and 100% donor.       HPI: Kat is doing well.  She has been off imatinib since 1/2018 with ongoing molecular CR and 100% donor. Today she notes she is doing well. Muscle cramps so much better after being off gleevec. No recent infections. Seeing her primary doc on a regular basis. Noted recent increased cholesterol and was started on fish oil. No cardiac issues, no pulmonary issues. Notes mild dry eyes but no dry mouth     10 point ROS otherwise negative    Physical Exam:  Blood pressure 127/72, pulse 84, temperature 97.6  F (36.4  C), resp. rate 16, weight 82.7 kg (182 lb 6.4 oz), SpO2 92 %.    ECOG PS: 0  Gen: Doing well. NAD  HEENT:  OP clear, no thrush, no GVHD symptoms. Poor dentition  Lungs: CTAB, no w/r/r  Cardiovascular: RRR, S1, S2, no m/r/g  Abdomen: +BS, soft, nontender, nondistended  MSK/Extremities:no edema  LN: no LASHANDA    Labs:  Results for KAT CONTEH (MRN [id number]) as of 1/17/2019 10:44   Ref. Range 1/17/2019 10:19   WBC Latest Ref Range: 4.0 - 11.0 10e9/L 9.7   Hemoglobin Latest Ref Range: 11.7 - 15.7 g/dL 14.3   Hematocrit Latest Ref Range: 35.0 - 47.0 % 44.6   Platelet Count Latest Ref Range: 150 - 450 10e9/L 387   RBC Count Latest Ref Range: 3.8 - 5.2 10e12/L 4.79   MCV Latest Ref Range: 78 - 100 fl 93   MCH Latest Ref Range: 26.5 - 33.0 pg 29.9   MCHC Latest  Ref Range: 31.5 - 36.5 g/dL 32.1   RDW Latest Ref Range: 10.0 - 15.0 % 13.9   Diff Method Unknown Automated Method   % Neutrophils Latest Units: % 53.0   % Lymphocytes Latest Units: % 33.1   % Monocytes Latest Units: % 9.8   % Eosinophils Latest Units: % 2.9   % Basophils Latest Units: % 0.9   % Immature Granulocytes Latest Units: % 0.3   Nucleated RBCs Latest Ref Range: 0 /100 0   Absolute Neutrophil Latest Ref Range: 1.6 - 8.3 10e9/L 5.2   Absolute Lymphocytes Latest Ref Range: 0.8 - 5.3 10e9/L 3.2   Absolute Monocytes Latest Ref Range: 0.0 - 1.3 10e9/L 1.0   Absolute Eosinophils Latest Ref Range: 0.0 - 0.7 10e9/L 0.3   Absolute Basophils Latest Ref Range: 0.0 - 0.2 10e9/L 0.1   Abs Immature Granulocytes Latest Ref Range: 0 - 0.4 10e9/L 0.0   Absolute Nucleated RBC Unknown 0.0       11/6/2018 BCR-ABL undetectable    BCR-ABL pending from today    A/P:    1. 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  --2 year anniversary visit (7/2011) showed no marrow evidence of ALL and 100% donor. BCR-ABL PCR on blood undetectable. FISH testing on marrow undetectable.  -- 3 -7 year anniversary visit with no evidence of disease  -- Given 100% donor (last checked 1/2018, pending today) and bcr-abl negative for multiple years we have been trying to taper the gleevec down  in light of her symptoms. During the prolonged taper, she has not had evidence of disease relapse. 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 Then spaced out to every 3 months and repeat today. If remains undetectable will repeat in July as planned.     2.  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 No symptoms currently off steroids  --History of cGVHD: Facial rash (4/13/10) not biopsied due to location + oral mucosa changes. Lip biopsy completed 4/13 and showed mild sialadenitis with focal atrophy suggestive of cGVHD. Not needing any treatment and not active currently      3. ID: no current antibiotics. No recent infections over last year  --S/P 1 year anniversary vaccines 7/10, 14 month boosters 10/10 (including flu shot).  --2 year anniversary vaccines 7/2011      4. GI: no issues noted today         5. FEN/Renal: stable to improved     6. Health Maintenance: has a primary doctor, Dr. Dangelo, back home.      7. Pain control: Being managed by her primary doc     8. Muscle cramps: resolved off imatinib     Final Plan:  - follow-up BCR-ABL  - Olamide in 7/2019 with labs then space out to annual if bcr-abl remains undetectable    Moon Quiñonez        Again, thank you for allowing me to participate in the care of your patient.      Sincerely,    Moon Quiñonez MD

## 2019-01-18 LAB — COPATH REPORT: NORMAL

## 2019-08-01 ENCOUNTER — ONCOLOGY VISIT (OUTPATIENT)
Dept: TRANSPLANT | Facility: CLINIC | Age: 59
End: 2019-08-01
Attending: INTERNAL MEDICINE
Payer: MEDICARE

## 2019-08-01 ENCOUNTER — APPOINTMENT (OUTPATIENT)
Dept: LAB | Facility: CLINIC | Age: 59
End: 2019-08-01
Attending: INTERNAL MEDICINE
Payer: MEDICARE

## 2019-08-01 VITALS
HEART RATE: 78 BPM | OXYGEN SATURATION: 95 % | DIASTOLIC BLOOD PRESSURE: 75 MMHG | WEIGHT: 184.7 LBS | SYSTOLIC BLOOD PRESSURE: 127 MMHG | BODY MASS INDEX: 33.78 KG/M2 | TEMPERATURE: 97.5 F

## 2019-08-01 DIAGNOSIS — Z94.81 S/P ALLOGENEIC BONE MARROW TRANSPLANT (H): ICD-10-CM

## 2019-08-01 DIAGNOSIS — C91.01 ACUTE LYMPHOBLASTIC LEUKEMIA IN REMISSION (H): ICD-10-CM

## 2019-08-01 LAB
ALBUMIN SERPL-MCNC: 3.6 G/DL (ref 3.4–5)
ALP SERPL-CCNC: 132 U/L (ref 40–150)
ALT SERPL W P-5'-P-CCNC: 28 U/L (ref 0–50)
ANION GAP SERPL CALCULATED.3IONS-SCNC: 4 MMOL/L (ref 3–14)
AST SERPL W P-5'-P-CCNC: 19 U/L (ref 0–45)
BASOPHILS # BLD AUTO: 0.1 10E9/L (ref 0–0.2)
BASOPHILS NFR BLD AUTO: 1.1 %
BILIRUB SERPL-MCNC: 0.2 MG/DL (ref 0.2–1.3)
BUN SERPL-MCNC: 23 MG/DL (ref 7–30)
CALCIUM SERPL-MCNC: 8.8 MG/DL (ref 8.5–10.1)
CHLORIDE SERPL-SCNC: 107 MMOL/L (ref 94–109)
CO2 SERPL-SCNC: 30 MMOL/L (ref 20–32)
CREAT SERPL-MCNC: 1.1 MG/DL (ref 0.52–1.04)
DIFFERENTIAL METHOD BLD: NORMAL
EOSINOPHIL # BLD AUTO: 0.3 10E9/L (ref 0–0.7)
EOSINOPHIL NFR BLD AUTO: 3.6 %
ERYTHROCYTE [DISTWIDTH] IN BLOOD BY AUTOMATED COUNT: 14.2 % (ref 10–15)
GFR SERPL CREATININE-BSD FRML MDRD: 55 ML/MIN/{1.73_M2}
GLUCOSE SERPL-MCNC: 85 MG/DL (ref 70–99)
HCT VFR BLD AUTO: 43.2 % (ref 35–47)
HGB BLD-MCNC: 13.7 G/DL (ref 11.7–15.7)
IMM GRANULOCYTES # BLD: 0 10E9/L (ref 0–0.4)
IMM GRANULOCYTES NFR BLD: 0.2 %
LYMPHOCYTES # BLD AUTO: 2.9 10E9/L (ref 0.8–5.3)
LYMPHOCYTES NFR BLD AUTO: 33.7 %
MCH RBC QN AUTO: 30.4 PG (ref 26.5–33)
MCHC RBC AUTO-ENTMCNC: 31.7 G/DL (ref 31.5–36.5)
MCV RBC AUTO: 96 FL (ref 78–100)
MONOCYTES # BLD AUTO: 1.1 10E9/L (ref 0–1.3)
MONOCYTES NFR BLD AUTO: 12.4 %
NEUTROPHILS # BLD AUTO: 4.3 10E9/L (ref 1.6–8.3)
NEUTROPHILS NFR BLD AUTO: 49 %
NRBC # BLD AUTO: 0 10*3/UL
NRBC BLD AUTO-RTO: 0 /100
PLATELET # BLD AUTO: 379 10E9/L (ref 150–450)
POTASSIUM SERPL-SCNC: 3.8 MMOL/L (ref 3.4–5.3)
PROT SERPL-MCNC: 7.2 G/DL (ref 6.8–8.8)
RBC # BLD AUTO: 4.51 10E12/L (ref 3.8–5.2)
SODIUM SERPL-SCNC: 140 MMOL/L (ref 133–144)
WBC # BLD AUTO: 8.7 10E9/L (ref 4–11)

## 2019-08-01 PROCEDURE — 81206 BCR/ABL1 GENE MAJOR BP: CPT | Performed by: INTERNAL MEDICINE

## 2019-08-01 PROCEDURE — 36415 COLL VENOUS BLD VENIPUNCTURE: CPT

## 2019-08-01 PROCEDURE — 80053 COMPREHEN METABOLIC PANEL: CPT | Performed by: INTERNAL MEDICINE

## 2019-08-01 PROCEDURE — G0463 HOSPITAL OUTPT CLINIC VISIT: HCPCS

## 2019-08-01 PROCEDURE — 85025 COMPLETE CBC W/AUTO DIFF WBC: CPT | Performed by: INTERNAL MEDICINE

## 2019-08-01 RX ORDER — CHLORAL HYDRATE 500 MG
2 CAPSULE ORAL DAILY
COMMUNITY

## 2019-08-01 ASSESSMENT — PAIN SCALES - GENERAL: PAINLEVEL: SEVERE PAIN (6)

## 2019-08-01 NOTE — NURSING NOTE
"Oncology Rooming Note    August 1, 2019 10:03 AM   Kat Conteh is a 59 year old female who presents for:    Chief Complaint   Patient presents with     Blood Draw     labs drawn via venipuncture by RN     RECHECK     provider visit s/p bmt txp for ALL     Initial Vitals: /75 (BP Location: Left arm, Patient Position: Chair, Cuff Size: Adult Regular)   Pulse 78   Temp 97.5  F (36.4  C) (Oral)   Wt 83.8 kg (184 lb 11.2 oz)   SpO2 95%   BMI 33.78 kg/m   Estimated body mass index is 33.78 kg/m  as calculated from the following:    Height as of 1/17/19: 1.575 m (5' 2\").    Weight as of this encounter: 83.8 kg (184 lb 11.2 oz). Body surface area is 1.91 meters squared.  Severe Pain (6) Comment: Data Unavailable   No LMP recorded. Patient is postmenopausal.  Allergies reviewed: Yes  Medications reviewed: Yes    Medications: Medication refills not needed today.  Pharmacy name entered into Vitals [email or web address]): [id number] [address]    Clinical concerns:  Patient has no complaints; denies fever/N/V/D.  She injured her left ankle two-three days ago.      JONO DC RN              "

## 2019-08-01 NOTE — NURSING NOTE
Chief Complaint   Patient presents with     Blood Draw     labs drawn via venipuncture by RN     /75 (BP Location: Left arm, Patient Position: Chair, Cuff Size: Adult Regular)   Pulse 78   Temp 97.5  F (36.4  C) (Oral)   Wt 83.8 kg (184 lb 11.2 oz)   SpO2 95%   BMI 33.78 kg/m      Labs collected and sent from right antecubital venipuncture in lab by RN. Pt tolerated well. Pt checked in for next appointment.    Tiffani Berry RN

## 2019-08-01 NOTE — PROGRESS NOTES
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 She obtained her repeat CR with imatinib alone and no DLI. We did collect lymphocytes for potential DLI should the need arise. She remained on imatinib until 1/2018 given ongoing molecular remission and 100% donor.       HPI: Kat is doing well.  She has been off imatinib since 1/2018 with ongoing molecular CR and 100% donor. She is feeling good. No recurrence of cramps off the imatinib. No recent infections, no fevers or chills, no chest pain or SOB, no GVHD symptoms, doing well. No medical changes    10 point ROS otherwise negative    Physical Exam:  Blood pressure 127/75, pulse 78, temperature 97.5  F (36.4  C), temperature source Oral, weight 83.8 kg (184 lb 11.2 oz), SpO2 95 %.    ECOG PS: 0  Gen: Doing well. NAD  HEENT:  No icterus or injection  No skin rash  Normal ambulation  Labs:    Results for KAT PAGE (MRN [id number]) as of 8/1/2019 10:19   Ref. Range 8/1/2019 09:23   Sodium Latest Ref Range: 133 - 144 mmol/L 140   Potassium Latest Ref Range: 3.4 - 5.3 mmol/L 3.8   Chloride Latest Ref Range: 94 - 109 mmol/L 107   Carbon Dioxide Latest Ref Range: 20 - 32 mmol/L 30   Urea Nitrogen Latest Ref Range: 7 - 30 mg/dL 23   Creatinine Latest Ref Range: 0.52 - 1.04 mg/dL 1.10 (H)   GFR Estimate Latest Ref Range: >60 mL/min/1.73_m2 55 (L)   GFR Estimate If Black Latest Ref Range: >60 mL/min/1.73_m2 63   Calcium Latest Ref Range: 8.5 - 10.1 mg/dL 8.8   Anion Gap Latest Ref Range: 3 - 14 mmol/L 4   Albumin Latest Ref Range: 3.4 - 5.0 g/dL 3.6   Protein Total Latest Ref Range: 6.8 - 8.8 g/dL 7.2   Bilirubin Total Latest Ref Range: 0.2 - 1.3 mg/dL 0.2   Alkaline Phosphatase Latest Ref Range: 40 - 150 U/L 132   ALT Latest Ref Range: 0 - 50 U/L 28   AST Latest Ref Range: 0 - 45 U/L 19   Glucose Latest Ref Range: 70 - 99 mg/dL 85   WBC  Latest Ref Range: 4.0 - 11.0 10e9/L 8.7   Hemoglobin Latest Ref Range: 11.7 - 15.7 g/dL 13.7   Hematocrit Latest Ref Range: 35.0 - 47.0 % 43.2   Platelet Count Latest Ref Range: 150 - 450 10e9/L 379   RBC Count Latest Ref Range: 3.8 - 5.2 10e12/L 4.51   MCV Latest Ref Range: 78 - 100 fl 96   MCH Latest Ref Range: 26.5 - 33.0 pg 30.4   MCHC Latest Ref Range: 31.5 - 36.5 g/dL 31.7   RDW Latest Ref Range: 10.0 - 15.0 % 14.2   Diff Method Unknown Automated Method   % Neutrophils Latest Units: % 49.0   % Lymphocytes Latest Units: % 33.7   % Monocytes Latest Units: % 12.4   % Eosinophils Latest Units: % 3.6   % Basophils Latest Units: % 1.1   % Immature Granulocytes Latest Units: % 0.2   Nucleated RBCs Latest Ref Range: 0 /100 0   Absolute Neutrophil Latest Ref Range: 1.6 - 8.3 10e9/L 4.3   Absolute Lymphocytes Latest Ref Range: 0.8 - 5.3 10e9/L 2.9   Absolute Monocytes Latest Ref Range: 0.0 - 1.3 10e9/L 1.1   Absolute Eosinophils Latest Ref Range: 0.0 - 0.7 10e9/L 0.3   Absolute Basophils Latest Ref Range: 0.0 - 0.2 10e9/L 0.1   Abs Immature Granulocytes Latest Ref Range: 0 - 0.4 10e9/L 0.0   Absolute Nucleated RBC Unknown 0.0       1/2019  BCR-ABL undetectable    BCR-ABL pending from today    A/P:    1. 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  --2 year anniversary visit (7/2011) showed no marrow evidence of ALL and 100% donor. BCR-ABL PCR on blood undetectable. FISH testing on marrow undetectable.  -- 3 -7 year anniversary visit with no evidence of disease  -- Given 100% donor  and bcr-abl negative for multiple years we have been trying to taper the gleevec down  in light of her symptoms. During the prolonged taper, she has not had evidence of disease relapse. 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   -- Will plan to repeat BCR-ABL in 6 months which will be 2 years off treatment. If remains negative then will discharge from clinic with plan for annual CBC with primary doc      2. 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 No symptoms currently off steroids  --History of cGVHD: Facial rash (4/13/10) not biopsied due to location + oral mucosa changes. Lip biopsy completed 4/13 and showed mild sialadenitis with focal atrophy suggestive of cGVHD. Not needing any treatment and not active currently      3. ID: no current antibiotics. No recent infections over last year  --S/P 1 year anniversary vaccines 7/10, 14 month boosters 10/10 (including flu shot).  --2 year anniversary vaccines 7/2011      4. GI: no issues noted today         5. FEN/Renal: stable to improved     6. Health Maintenance: has a primary doctor, Dr. Dangelo, back home.      7. Pain control: Being managed by her primary doc     8. Muscle cramps: resolved off imatinib     Final Plan:  - RTC in 1/2020 for final visit if bcr-abl remains negative    Moon Quiñonez    Addendum 8/4/2019:  Bcr-abl undetectable. Final follow-up planned in 6 months    Moon Quiñonez

## 2019-08-02 LAB — COPATH REPORT: NORMAL

## 2019-08-05 ENCOUNTER — CARE COORDINATION (OUTPATIENT)
Dept: TRANSPLANT | Facility: CLINIC | Age: 59
End: 2019-08-05

## 2019-08-05 NOTE — PROGRESS NOTES
Per request from Dr Quiñonez, called pt to inform her that her BCR ABL remains undetectable. Pt verbalized understanding and had no further questions or concerns.

## 2020-01-30 ENCOUNTER — APPOINTMENT (OUTPATIENT)
Dept: LAB | Facility: CLINIC | Age: 60
End: 2020-01-30
Attending: INTERNAL MEDICINE
Payer: MEDICARE

## 2020-01-30 ENCOUNTER — ONCOLOGY VISIT (OUTPATIENT)
Dept: TRANSPLANT | Facility: CLINIC | Age: 60
End: 2020-01-30
Attending: INTERNAL MEDICINE
Payer: MEDICARE

## 2020-01-30 VITALS
OXYGEN SATURATION: 95 % | DIASTOLIC BLOOD PRESSURE: 78 MMHG | SYSTOLIC BLOOD PRESSURE: 131 MMHG | TEMPERATURE: 97.6 F | HEART RATE: 76 BPM | WEIGHT: 189.38 LBS | RESPIRATION RATE: 16 BRPM | BODY MASS INDEX: 34.64 KG/M2

## 2020-01-30 DIAGNOSIS — C91.01 ACUTE LYMPHOBLASTIC LEUKEMIA IN REMISSION (H): ICD-10-CM

## 2020-01-30 LAB
ALBUMIN SERPL-MCNC: 3.6 G/DL (ref 3.4–5)
ALP SERPL-CCNC: 110 U/L (ref 40–150)
ALT SERPL W P-5'-P-CCNC: 29 U/L (ref 0–50)
ANION GAP SERPL CALCULATED.3IONS-SCNC: 4 MMOL/L (ref 3–14)
AST SERPL W P-5'-P-CCNC: 17 U/L (ref 0–45)
BASOPHILS # BLD AUTO: 0.1 10E9/L (ref 0–0.2)
BASOPHILS NFR BLD AUTO: 0.8 %
BILIRUB SERPL-MCNC: 0.3 MG/DL (ref 0.2–1.3)
BUN SERPL-MCNC: 24 MG/DL (ref 7–30)
CALCIUM SERPL-MCNC: 9.4 MG/DL (ref 8.5–10.1)
CHLORIDE SERPL-SCNC: 107 MMOL/L (ref 94–109)
CO2 SERPL-SCNC: 29 MMOL/L (ref 20–32)
CREAT SERPL-MCNC: 0.96 MG/DL (ref 0.52–1.04)
DIFFERENTIAL METHOD BLD: NORMAL
EOSINOPHIL # BLD AUTO: 0.3 10E9/L (ref 0–0.7)
EOSINOPHIL NFR BLD AUTO: 2.7 %
ERYTHROCYTE [DISTWIDTH] IN BLOOD BY AUTOMATED COUNT: 13.5 % (ref 10–15)
GFR SERPL CREATININE-BSD FRML MDRD: 64 ML/MIN/{1.73_M2}
GLUCOSE SERPL-MCNC: 93 MG/DL (ref 70–99)
HCT VFR BLD AUTO: 41.7 % (ref 35–47)
HGB BLD-MCNC: 13.4 G/DL (ref 11.7–15.7)
IMM GRANULOCYTES # BLD: 0 10E9/L (ref 0–0.4)
IMM GRANULOCYTES NFR BLD: 0.3 %
LYMPHOCYTES # BLD AUTO: 3.3 10E9/L (ref 0.8–5.3)
LYMPHOCYTES NFR BLD AUTO: 32.9 %
MCH RBC QN AUTO: 30.1 PG (ref 26.5–33)
MCHC RBC AUTO-ENTMCNC: 32.1 G/DL (ref 31.5–36.5)
MCV RBC AUTO: 94 FL (ref 78–100)
MONOCYTES # BLD AUTO: 1 10E9/L (ref 0–1.3)
MONOCYTES NFR BLD AUTO: 10.5 %
NEUTROPHILS # BLD AUTO: 5.2 10E9/L (ref 1.6–8.3)
NEUTROPHILS NFR BLD AUTO: 52.8 %
NRBC # BLD AUTO: 0 10*3/UL
NRBC BLD AUTO-RTO: 0 /100
PLATELET # BLD AUTO: 417 10E9/L (ref 150–450)
POTASSIUM SERPL-SCNC: 4.1 MMOL/L (ref 3.4–5.3)
PROT SERPL-MCNC: 7 G/DL (ref 6.8–8.8)
RBC # BLD AUTO: 4.45 10E12/L (ref 3.8–5.2)
SODIUM SERPL-SCNC: 141 MMOL/L (ref 133–144)
WBC # BLD AUTO: 9.9 10E9/L (ref 4–11)

## 2020-01-30 PROCEDURE — 80053 COMPREHEN METABOLIC PANEL: CPT | Performed by: INTERNAL MEDICINE

## 2020-01-30 PROCEDURE — 85025 COMPLETE CBC W/AUTO DIFF WBC: CPT | Performed by: INTERNAL MEDICINE

## 2020-01-30 PROCEDURE — 36415 COLL VENOUS BLD VENIPUNCTURE: CPT

## 2020-01-30 PROCEDURE — 81206 BCR/ABL1 GENE MAJOR BP: CPT | Performed by: INTERNAL MEDICINE

## 2020-01-30 PROCEDURE — G0463 HOSPITAL OUTPT CLINIC VISIT: HCPCS | Mod: ZF

## 2020-01-30 ASSESSMENT — PAIN SCALES - GENERAL: PAINLEVEL: NO PAIN (0)

## 2020-01-30 NOTE — LETTER
1/30/2020     RE: Kat Conteh  [address] [address] [address]     Dear Colleague,    Thank you for referring your patient, Kat Conteh, to the [facility] BLOOD AND MARROW TRANSPLANT at [facility]. Please see a copy of my visit note below.  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 She obtained her repeat CR with imatinib alone and no DLI. We did collect lymphocytes for potential DLI should the need arise. She remained on imatinib until 1/2018 given ongoing molecular remission and 100% donor.       HPI: Kat is doing well.  She has been off imatinib since 1/2018 with ongoing molecular CR and 100% donor.Today she notes she is doing great and excited that this will be her last visit in clinic. No fevers or chills, no recent infections, no change in medical history, no new rashes. No concerns     10 point ROS otherwise negative    Physical Exam:  Blood pressure 131/78, pulse 76, temperature 97.6  F (36.4  C), temperature source Oral, resp. rate 16, weight 85.9 kg (189 lb 6 oz), SpO2 95 %.    ECOG PS: 0  Gen: Doing well. NAD  HEENT:  No icterus or injection, OP clear, no thrush or GVHD changes  Lungs: CTAB  CV: RRR  Ext: no edema  No skin rash    Labs:  Results for KAT CONTEH (MRN [id number]) as of 1/30/2020 10:02   Ref. Range 1/30/2020 09:44   WBC Latest Ref Range: 4.0 - 11.0 10e9/L 9.9   Hemoglobin Latest Ref Range: 11.7 - 15.7 g/dL 13.4   Hematocrit Latest Ref Range: 35.0 - 47.0 % 41.7   Platelet Count Latest Ref Range: 150 - 450 10e9/L 417   RBC Count Latest Ref Range: 3.8 - 5.2 10e12/L 4.45   MCV Latest Ref Range: 78 - 100 fl 94   MCH Latest Ref Range: 26.5 - 33.0 pg 30.1   MCHC Latest Ref Range: 31.5 - 36.5 g/dL 32.1   RDW Latest Ref Range: 10.0 - 15.0 % 13.5   Diff Method Unknown Automated Method    % Neutrophils Latest Units: % 52.8   % Lymphocytes Latest Units: % 32.9   % Monocytes Latest Units: % 10.5   % Eosinophils Latest Units: % 2.7   % Basophils Latest Units: % 0.8   % Immature Granulocytes Latest Units: % 0.3   Nucleated RBCs Latest Ref Range: 0 /100 0   Absolute Neutrophil Latest Ref Range: 1.6 - 8.3 10e9/L 5.2   Absolute Lymphocytes Latest Ref Range: 0.8 - 5.3 10e9/L 3.3   Absolute Monocytes Latest Ref Range: 0.0 - 1.3 10e9/L 1.0   Absolute Eosinophils Latest Ref Range: 0.0 - 0.7 10e9/L 0.3   Absolute Basophils Latest Ref Range: 0.0 - 0.2 10e9/L 0.1   Abs Immature Granulocytes Latest Ref Range: 0 - 0.4 10e9/L 0.0   Absolute Nucleated RBC Unknown 0.0     Results for PADILLAJENNIFERJANINA KEYSHA DE GUZMAN (MRN [id number]) as of 1/30/2020 10:22   Ref. Range 1/30/2020 09:44   Sodium Latest Ref Range: 133 - 144 mmol/L 141   Potassium Latest Ref Range: 3.4 - 5.3 mmol/L 4.1   Chloride Latest Ref Range: 94 - 109 mmol/L 107   Carbon Dioxide Latest Ref Range: 20 - 32 mmol/L 29   Urea Nitrogen Latest Ref Range: 7 - 30 mg/dL 24   Creatinine Latest Ref Range: 0.52 - 1.04 mg/dL 0.96   GFR Estimate Latest Ref Range: >60 mL/min/1.73_m2 64   GFR Estimate If Black Latest Ref Range: >60 mL/min/1.73_m2 74   Calcium Latest Ref Range: 8.5 - 10.1 mg/dL 9.4   Anion Gap Latest Ref Range: 3 - 14 mmol/L 4   Albumin Latest Ref Range: 3.4 - 5.0 g/dL 3.6   Protein Total Latest Ref Range: 6.8 - 8.8 g/dL 7.0   Bilirubin Total Latest Ref Range: 0.2 - 1.3 mg/dL 0.3   Alkaline Phosphatase Latest Ref Range: 40 - 150 U/L 110   ALT Latest Ref Range: 0 - 50 U/L 29   AST Latest Ref Range: 0 - 45 U/L 17       BCR-ABL pending      A/P:    1. 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  --2 year anniversary visit (7/2011) showed no marrow evidence of ALL  and 100% donor. BCR-ABL PCR on blood undetectable. FISH testing on marrow undetectable.  -- 3 -7 year anniversary visit with no evidence of disease  -- Given 100% donor  and bcr-abl negative for multiple years we tapered the gleevec down  in light of her symptoms. During the prolonged taper, she had no evidence of disease relapse. 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   -- Repeat BCR-ABL now is 2 years off treatment. If remains negative then will discharge from clinic with plan for annual CBC with primary doc      2. 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 No symptoms currently off steroids  --History of cGVHD: Facial rash (4/13/10) not biopsied due to location + oral mucosa changes. Lip biopsy completed 4/13 and showed mild sialadenitis with focal atrophy suggestive of cGVHD. Not needing any treatment and not active currently      3. ID: no current antibiotics. No recent infections over last year. Declines Flu shot today  --S/P 1 year anniversary vaccines 7/10, 14 month boosters 10/10 (including flu shot).  --2 year anniversary vaccines 7/2011      4. GI: no issues noted today         5. FEN/Renal: stable to improved     6. Health Maintenance: has a primary doctor, Dr. Dangelo, back home.      7. Pain control: Being managed by her primary doc     8. Muscle cramps: resolved off imatinib     Final Plan:  - follow-up BCR-ABL  - if undetectable then discharge from clinic to follow with primary care doc with yearly CBC    Moon Quiñonez MD

## 2020-01-30 NOTE — PROGRESS NOTES
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 She obtained her repeat CR with imatinib alone and no DLI. We did collect lymphocytes for potential DLI should the need arise. She remained on imatinib until 1/2018 given ongoing molecular remission and 100% donor.       HPI: Kat is doing well.  She has been off imatinib since 1/2018 with ongoing molecular CR and 100% donor.Today she notes she is doing great and excited that this will be her last visit in clinic. No fevers or chills, no recent infections, no change in medical history, no new rashes. No concerns     10 point ROS otherwise negative    Physical Exam:  Blood pressure 131/78, pulse 76, temperature 97.6  F (36.4  C), temperature source Oral, resp. rate 16, weight 85.9 kg (189 lb 6 oz), SpO2 95 %.    ECOG PS: 0  Gen: Doing well. NAD  HEENT:  No icterus or injection, OP clear, no thrush or GVHD changes  Lungs: CTAB  CV: RRR  Ext: no edema  No skin rash    Labs:  Results for KAT PAGE (MRN [id number]) as of 1/30/2020 10:02   Ref. Range 1/30/2020 09:44   WBC Latest Ref Range: 4.0 - 11.0 10e9/L 9.9   Hemoglobin Latest Ref Range: 11.7 - 15.7 g/dL 13.4   Hematocrit Latest Ref Range: 35.0 - 47.0 % 41.7   Platelet Count Latest Ref Range: 150 - 450 10e9/L 417   RBC Count Latest Ref Range: 3.8 - 5.2 10e12/L 4.45   MCV Latest Ref Range: 78 - 100 fl 94   MCH Latest Ref Range: 26.5 - 33.0 pg 30.1   MCHC Latest Ref Range: 31.5 - 36.5 g/dL 32.1   RDW Latest Ref Range: 10.0 - 15.0 % 13.5   Diff Method Unknown Automated Method   % Neutrophils Latest Units: % 52.8   % Lymphocytes Latest Units: % 32.9   % Monocytes Latest Units: % 10.5   % Eosinophils Latest Units: % 2.7   % Basophils Latest Units: % 0.8   % Immature Granulocytes Latest Units: % 0.3   Nucleated RBCs Latest Ref Range: 0 /100 0   Absolute Neutrophil Latest Ref Range:  1.6 - 8.3 10e9/L 5.2   Absolute Lymphocytes Latest Ref Range: 0.8 - 5.3 10e9/L 3.3   Absolute Monocytes Latest Ref Range: 0.0 - 1.3 10e9/L 1.0   Absolute Eosinophils Latest Ref Range: 0.0 - 0.7 10e9/L 0.3   Absolute Basophils Latest Ref Range: 0.0 - 0.2 10e9/L 0.1   Abs Immature Granulocytes Latest Ref Range: 0 - 0.4 10e9/L 0.0   Absolute Nucleated RBC Unknown 0.0     Results for KEYSHA PAGE (MRN [id number]) as of 1/30/2020 10:22   Ref. Range 1/30/2020 09:44   Sodium Latest Ref Range: 133 - 144 mmol/L 141   Potassium Latest Ref Range: 3.4 - 5.3 mmol/L 4.1   Chloride Latest Ref Range: 94 - 109 mmol/L 107   Carbon Dioxide Latest Ref Range: 20 - 32 mmol/L 29   Urea Nitrogen Latest Ref Range: 7 - 30 mg/dL 24   Creatinine Latest Ref Range: 0.52 - 1.04 mg/dL 0.96   GFR Estimate Latest Ref Range: >60 mL/min/1.73_m2 64   GFR Estimate If Black Latest Ref Range: >60 mL/min/1.73_m2 74   Calcium Latest Ref Range: 8.5 - 10.1 mg/dL 9.4   Anion Gap Latest Ref Range: 3 - 14 mmol/L 4   Albumin Latest Ref Range: 3.4 - 5.0 g/dL 3.6   Protein Total Latest Ref Range: 6.8 - 8.8 g/dL 7.0   Bilirubin Total Latest Ref Range: 0.2 - 1.3 mg/dL 0.3   Alkaline Phosphatase Latest Ref Range: 40 - 150 U/L 110   ALT Latest Ref Range: 0 - 50 U/L 29   AST Latest Ref Range: 0 - 45 U/L 17       BCR-ABL pending      A/P:    1. 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  --2 year anniversary visit (7/2011) showed no marrow evidence of ALL and 100% donor. BCR-ABL PCR on blood undetectable. FISH testing on marrow undetectable.  -- 3 -7 year anniversary visit with no evidence of disease  -- Given 100% donor  and bcr-abl negative for multiple years we tapered the gleevec down  in light of her symptoms. During the prolonged taper, she had no  evidence of disease relapse. 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   -- Repeat BCR-ABL now is 2 years off treatment. If remains negative then will discharge from clinic with plan for annual CBC with primary doc      2. 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 No symptoms currently off steroids  --History of cGVHD: Facial rash (4/13/10) not biopsied due to location + oral mucosa changes. Lip biopsy completed 4/13 and showed mild sialadenitis with focal atrophy suggestive of cGVHD. Not needing any treatment and not active currently      3. ID: no current antibiotics. No recent infections over last year. Declines Flu shot today  --S/P 1 year anniversary vaccines 7/10, 14 month boosters 10/10 (including flu shot).  --2 year anniversary vaccines 7/2011      4. GI: no issues noted today         5. FEN/Renal: stable to improved     6. Health Maintenance: has a primary doctor, Dr. Dangelo, back home.      7. Pain control: Being managed by her primary doc     8. Muscle cramps: resolved off imatinib     Final Plan:  - follow-up BCR-ABL  - if undetectable then discharge from clinic to follow with primary care doc with yearly CBC    Moon Quiñonez MD    Addendum 2.4.2020:  BCR-ABL undetectable.    Will communicate results to Kat and she can be discharged from the BMT clinic    Moon Quiñonez MD

## 2020-01-30 NOTE — NURSING NOTE
"Oncology Rooming Note    January 30, 2020 10:27 AM   Kat Conteh is a 60 year old female who presents for:    Chief Complaint   Patient presents with     Blood Draw     Labs drawn by RN in Lab via Right Arm VPT.      RECHECK     Return: Acute lymphocytic leukemia in remission (H)     Initial Vitals: /78   Pulse 76   Temp 97.6  F (36.4  C) (Oral)   Resp 16   Wt 85.9 kg (189 lb 6 oz)   SpO2 95%   BMI 34.64 kg/m   Estimated body mass index is 34.64 kg/m  as calculated from the following:    Height as of 1/17/19: 1.575 m (5' 2\").    Weight as of this encounter: 85.9 kg (189 lb 6 oz). Body surface area is 1.94 meters squared.  No Pain (0) Comment: Data Unavailable   No LMP recorded. Patient is postmenopausal.  Allergies reviewed: Yes   Medications reviewed: Yes   Medications: Medication refills not needed today.  Pharmacy name entered into [employer]: [id number] [address]    Clinical concerns: ERWIN Marshall CMA              "

## 2020-01-30 NOTE — NURSING NOTE
Chief Complaint   Patient presents with     Blood Draw     Labs drawn by RN in Lab via Right Arm VPT.      Chula Rodriguez RN

## 2020-02-04 LAB — COPATH REPORT: NORMAL
